# Patient Record
Sex: FEMALE | Race: BLACK OR AFRICAN AMERICAN | Employment: FULL TIME | URBAN - METROPOLITAN AREA
[De-identification: names, ages, dates, MRNs, and addresses within clinical notes are randomized per-mention and may not be internally consistent; named-entity substitution may affect disease eponyms.]

---

## 2017-08-17 ENCOUNTER — HOSPITAL ENCOUNTER (EMERGENCY)
Age: 27
Discharge: HOME | End: 2017-08-17
Attending: EMERGENCY MEDICINE
Payer: COMMERCIAL

## 2017-08-17 VITALS
RESPIRATION RATE: 16 BRPM | DIASTOLIC BLOOD PRESSURE: 88 MMHG | TEMPERATURE: 98.9 F | SYSTOLIC BLOOD PRESSURE: 142 MMHG | OXYGEN SATURATION: 100 % | BODY MASS INDEX: 48.82 KG/M2 | WEIGHT: 293 LBS | HEART RATE: 99 BPM | HEIGHT: 65 IN
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
## 2017-08-17 DIAGNOSIS — G44.311 INTRACTABLE ACUTE POST-TRAUMATIC HEADACHE: ICD-10-CM

## 2017-08-17 DIAGNOSIS — F17.200 TOBACCO DEPENDENCE: ICD-10-CM

## 2017-08-17 DIAGNOSIS — M54.50 ACUTE BILATERAL LOW BACK PAIN WITHOUT SCIATICA: Primary | ICD-10-CM

## 2017-08-17 PROCEDURE — 74011250637 HC RX REV CODE- 250/637: Performed by: PHYSICIAN ASSISTANT

## 2017-08-17 PROCEDURE — 96372 THER/PROPH/DIAG INJ SC/IM: CPT

## 2017-08-17 PROCEDURE — 74011250636 HC RX REV CODE- 250/636: Performed by: PHYSICIAN ASSISTANT

## 2017-08-17 PROCEDURE — 99283 EMERGENCY DEPT VISIT LOW MDM: CPT

## 2017-08-17 RX ORDER — OXYCODONE AND ACETAMINOPHEN 5; 325 MG/1; MG/1
1 TABLET ORAL
Qty: 12 TAB | Refills: 0 | Status: SHIPPED | OUTPATIENT
Start: 2017-08-17 | End: 2017-11-13

## 2017-08-17 RX ORDER — ONDANSETRON 4 MG/1
4 TABLET, ORALLY DISINTEGRATING ORAL
Status: DISCONTINUED | OUTPATIENT
Start: 2017-08-17 | End: 2017-08-17 | Stop reason: HOSPADM

## 2017-08-17 RX ORDER — METHOCARBAMOL 750 MG/1
750 TABLET, FILM COATED ORAL 4 TIMES DAILY
Qty: 15 TAB | Refills: 0 | Status: SHIPPED | OUTPATIENT
Start: 2017-08-17 | End: 2017-08-22

## 2017-08-17 RX ORDER — OXYCODONE AND ACETAMINOPHEN 5; 325 MG/1; MG/1
1 TABLET ORAL
Status: COMPLETED | OUTPATIENT
Start: 2017-08-17 | End: 2017-08-17

## 2017-08-17 RX ORDER — MORPHINE SULFATE 10 MG/ML
2 INJECTION, SOLUTION INTRAMUSCULAR; INTRAVENOUS
Status: COMPLETED | OUTPATIENT
Start: 2017-08-17 | End: 2017-08-17

## 2017-08-17 RX ADMIN — MORPHINE SULFATE 2 MG: 10 INJECTION INTRAMUSCULAR; INTRAVENOUS; SUBCUTANEOUS at 19:11

## 2017-08-17 RX ADMIN — ONDANSETRON 4 MG: 4 TABLET, ORALLY DISINTEGRATING ORAL at 18:46

## 2017-08-17 RX ADMIN — OXYCODONE HYDROCHLORIDE AND ACETAMINOPHEN 1 TABLET: 5; 325 TABLET ORAL at 18:46
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
## 2017-08-17 NOTE — ED NOTES
KRISTINA Newman at bedside to provide discharge paperwork. Vital signs stable. Pt in no apparent distress at this time. Mental status at baseline. Ambulatory to waiting room with steady gate, discharge paperwork in hand. Accompanied by visitors.
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
## 2017-08-21 ENCOUNTER — HOSPITAL ENCOUNTER (EMERGENCY)
Age: 27
Discharge: HOME | End: 2017-08-21
Attending: EMERGENCY MEDICINE
Payer: COMMERCIAL

## 2017-08-21 ENCOUNTER — OFFICE VISIT (OUTPATIENT)
Dept: INTERNAL MEDICINE CLINIC | Age: 27
End: 2017-08-21

## 2017-08-21 VITALS
DIASTOLIC BLOOD PRESSURE: 77 MMHG | WEIGHT: 293 LBS | SYSTOLIC BLOOD PRESSURE: 121 MMHG | OXYGEN SATURATION: 98 % | TEMPERATURE: 99 F | BODY MASS INDEX: 48.82 KG/M2 | HEART RATE: 82 BPM | RESPIRATION RATE: 16 BRPM | HEIGHT: 65 IN

## 2017-08-21 VITALS
RESPIRATION RATE: 16 BRPM | BODY MASS INDEX: 47.09 KG/M2 | HEIGHT: 66 IN | OXYGEN SATURATION: 100 % | WEIGHT: 293 LBS | DIASTOLIC BLOOD PRESSURE: 84 MMHG | TEMPERATURE: 98.2 F | HEART RATE: 82 BPM | SYSTOLIC BLOOD PRESSURE: 144 MMHG

## 2017-08-21 DIAGNOSIS — F32.A DEPRESSION, UNSPECIFIED DEPRESSION TYPE: ICD-10-CM

## 2017-08-21 DIAGNOSIS — L73.2 HIDRADENITIS AXILLARIS: ICD-10-CM

## 2017-08-21 DIAGNOSIS — M54.50 ACUTE BILATERAL LOW BACK PAIN WITHOUT SCIATICA: ICD-10-CM

## 2017-08-21 DIAGNOSIS — G44.301 INTRACTABLE POST-TRAUMATIC HEADACHE, UNSPECIFIED CHRONICITY PATTERN: ICD-10-CM

## 2017-08-21 DIAGNOSIS — F32.A DEPRESSION, UNSPECIFIED DEPRESSION TYPE: Primary | ICD-10-CM

## 2017-08-21 DIAGNOSIS — L73.2 HIDRADENITIS SUPPURATIVA: Primary | ICD-10-CM

## 2017-08-21 DIAGNOSIS — E66.01 MORBID OBESITY WITH BMI OF 45.0-49.9, ADULT (HCC): ICD-10-CM

## 2017-08-21 PROCEDURE — 74011250637 HC RX REV CODE- 250/637: Performed by: PHYSICIAN ASSISTANT

## 2017-08-21 PROCEDURE — 99283 EMERGENCY DEPT VISIT LOW MDM: CPT

## 2017-08-21 RX ORDER — OXYCODONE AND ACETAMINOPHEN 5; 325 MG/1; MG/1
2 TABLET ORAL
Status: COMPLETED | OUTPATIENT
Start: 2017-08-21 | End: 2017-08-21

## 2017-08-21 RX ORDER — CEPHALEXIN 500 MG/1
500 CAPSULE ORAL 4 TIMES DAILY
Qty: 40 CAP | Refills: 0 | Status: SHIPPED | OUTPATIENT
Start: 2017-08-21 | End: 2017-08-31

## 2017-08-21 RX ORDER — ESCITALOPRAM OXALATE 10 MG/1
10 TABLET ORAL DAILY
Qty: 30 TAB | Refills: 1 | Status: SHIPPED | OUTPATIENT
Start: 2017-08-21 | End: 2017-12-21

## 2017-08-21 RX ORDER — LIDOCAINE 50 MG/G
PATCH TOPICAL
Qty: 15 EACH | Refills: 0 | Status: SHIPPED | OUTPATIENT
Start: 2017-08-21 | End: 2017-12-21

## 2017-08-21 RX ADMIN — OXYCODONE HYDROCHLORIDE AND ACETAMINOPHEN 2 TABLET: 5; 325 TABLET ORAL at 11:02
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
## 2017-08-21 NOTE — PATIENT INSTRUCTIONS
Back Pain: Care Instructions  Your Care Instructions    Back pain has many possible causes. It is often related to problems with muscles and ligaments of the back. It may also be related to problems with the nerves, discs, or bones of the back. Moving, lifting, standing, sitting, or sleeping in an awkward way can strain the back. Sometimes you don't notice the injury until later. Arthritis is another common cause of back pain. Although it may hurt a lot, back pain usually improves on its own within several weeks. Most people recover in 12 weeks or less. Using good home treatment and being careful not to stress your back can help you feel better sooner. Follow-up care is a key part of your treatment and safety. Be sure to make and go to all appointments, and call your doctor if you are having problems. Its also a good idea to know your test results and keep a list of the medicines you take. How can you care for yourself at home? · Sit or lie in positions that are most comfortable and reduce your pain. Try one of these positions when you lie down:  ¨ Lie on your back with your knees bent and supported by large pillows. ¨ Lie on the floor with your legs on the seat of a sofa or chair. [place] on your side with your knees and hips bent and a pillow between your legs. ¨ Lie on your stomach if it does not make pain worse. · Do not sit up in bed, and avoid soft couches and twisted positions. Bed rest can help relieve pain at first, but it delays healing. Avoid bed rest after the first day of back pain. · Change positions every 30 minutes. If you must sit for long periods of time, take breaks from sitting. Get up and walk around, or lie in a comfortable position. · Try using a heating pad on a low or medium setting for 15 to 20 minutes every 2 or 3 hours. Try a warm shower in place of one session with the heating pad. · You can also try an ice pack for 10 to 15 minutes every 2 to 3 hours.  Put a thin cloth between the ice pack and your skin. · Take pain medicines exactly as directed. ¨ If the doctor gave you a prescription medicine for pain, take it as prescribed. ¨ If you are not taking a prescription pain medicine, ask your doctor if you can take an over-the-counter medicine. · Take short walks several times a day. You can start with 5 to 10 minutes, 3 or 4 times a day, and work up to longer walks. Walk on level surfaces and avoid hills and stairs until your back is better. · Return to work and other activities as soon as you can. Continued rest without activity is usually not good for your back. · To prevent future back pain, do exercises to stretch and strengthen your back and stomach. Learn how to use good posture, safe lifting techniques, and proper body mechanics. When should you call for help? Call your doctor now or seek immediate medical care if:  · You have new or worsening numbness in your legs. · You have new or worsening weakness in your legs. (This could make it hard to stand up.)  · You lose control of your bladder or bowels. Watch closely for changes in your health, and be sure to contact your doctor if:  · Your pain gets worse. · You are not getting better after 2 weeks. Where can you learn more? Go to http://cl-diana.info/. Enter G683 in the search box to learn more about \"Back Pain: Care Instructions. \"  Current as of: March 21, 2017  Content Version: 11.3  © 8315-0979 [employer]. Care instructions adapted under license by [employer] (which disclaims liability or warranty for this information). If you have questions about a medical condition or this instruction, always ask your healthcare professional. [address] any warranty or liability for your use of this information.

## 2017-08-21 NOTE — DISCHARGE INSTRUCTIONS
We hope that we have addressed all of your medical concerns. The examination and treatment you received in the Emergency Department were for an emergent problem and were not intended as complete care. It is important that you follow up with your healthcare provider(s) for ongoing care. If your symptoms worsen or do not improve as expected, and you are unable to reach your usual health care provider(s), you should return to the Emergency Department. Today's healthcare is undergoing tremendous change, and patient satisfaction surveys are one of the many tools to assess the quality of medical care. You may receive a survey from the [employer] organization regarding your experience in the Emergency Department. I hope that your experience has been completely positive, particularly the medical care that I provided. As such, please participate in the survey; anything less than excellent does not meet my expectations or intentions. [facility] and [facility] participate in nationally recognized quality of care measures. If your blood pressure is greater than 120/80, as reported below, we urge that you seek medical care to address the potential of high blood pressure, commonly known as hypertension. Hypertension can be hereditary or can be caused by certain medical conditions, pain, stress, or \"white coat syndrome. \"       Please make an appointment with your health care provider(s) for follow up of your Emergency Department visit. VITALS:   Patient Vitals for the past 8 hrs:   Temp Pulse Resp BP SpO2   08/21/17 0941 98.2 °F (36.8 °C) 88 16 133/89 98 %          Thank you for allowing us to provide you with medical care today. We realize that you have many choices for your emergency care needs. Please choose us in the future for any continued health care needs. Regards,           April VIVIAN.  Ricki, [address] [facility]. Office: [phone]            No results found for this or any previous visit (from the past 24 hour(s)). No results found. Recovering From Depression: Care Instructions  Your Care Instructions  Taking good care of yourself is important as you recover from depression. In time, your symptoms will fade as your treatment takes hold. Do not give up. Instead, focus your energy on getting better. Your mood will improve. It just takes some time. Focus on things that can help you feel better, such as being with friends and family, eating well, and getting enough rest. But take things slowly. Do not do too much too soon. You will begin to feel better gradually. Follow-up care is a key part of your treatment and safety. Be sure to make and go to all appointments, and call your doctor if you are having problems. It's also a good idea to know your test results and keep a list of the medicines you take. How can you care for yourself at home? Be realistic  · If you have a large task to do, break it up into smaller steps you can handle, and just do what you can. · You may want to put off important decisions until your depression has lifted. If you have plans that will have a major impact on your life, such as marriage, divorce, or a job change, try to wait a bit. Talk it over with friends and loved ones who can help you look at the overall picture first.  · Reaching out to people for help is important. Do not isolate yourself. Let your family and friends help you. Find someone you can trust and confide in, and talk to that person. · Be patient, and be kind to yourself. Remember that depression is not your fault and is not something you can overcome with willpower alone. Treatment is necessary for depression, just like for any other illness. Feeling better takes time, and your mood will improve little by little. Stay active  · Stay busy and get outside. Take a walk, or try some other light exercise.   · Talk with your doctor about an exercise program. Exercise can help with mild depression. · Go to a movie or concert. Take part in a [employer] activity or other social gathering. Go to a ball game. · Ask a friend to have dinner with you. Take care of yourself  · Eat a balanced diet with plenty of fresh fruits and vegetables, whole grains, and lean protein. If you have lost your appetite, eat small snacks rather than large meals. · Avoid drinking alcohol or using illegal drugs. Do not take medicines that have not been prescribed for you. They may interfere with medicines you may be taking for depression, or they may make your depression worse. · Take your medicines exactly as they are prescribed. You may start to feel better within 1 to 3 weeks of taking antidepressant medicine. But it can take as many as 6 to 8 weeks to see more improvement. If you have questions or concerns about your medicines, or if you do not notice any improvement by 3 weeks, talk to your doctor. · If you have any side effects from your medicine, tell your doctor. Antidepressants can make you feel tired, dizzy, or nervous. Some people have dry mouth, constipation, headaches, sexual problems, or diarrhea. Many of these side effects are mild and will go away on their own after you have been taking the medicine for a few weeks. Some may last longer. Talk to your doctor if side effects are bothering you too much. You might be able to try a different medicine. · Get enough sleep. If you have problems sleeping:  ¨ Go to bed at the same time every night, and get up at the same time every morning. ¨ Keep your bedroom dark and quiet. ¨ Do not exercise after 5:00 p.m. ¨ Avoid drinks with caffeine after 5:00 p.m. · Avoid sleeping pills unless they are prescribed by the doctor treating your depression. Sleeping pills may make you groggy during the day, and they may interact with other medicine you are taking.   · If you have any other illnesses, such as diabetes, heart disease, or high blood pressure, make sure to continue with your treatment. Tell your doctor about all of the medicines you take, including those with or without a prescription. · Keep the numbers for these national suicide hotlines: 2-196-543-TALK (3-509.929.6054) and 5-775-OSJRNTF (2-184.834.6103). If you or someone you know talks about suicide or feeling hopeless, get help right away. When should you call for help? Call 911 anytime you think you may need emergency care. For example, call if:  · You feel like hurting yourself or someone else. · Someone you know has depression and is about to attempt or is attempting suicide. Call your doctor now or seek immediate medical care if:  · You hear voices. · Someone you know has depression and:  ¨ Starts to give away his or her possessions. ¨ Uses illegal drugs or drinks alcohol heavily. ¨ Talks or writes about death, including writing suicide notes or talking about guns, knives, or pills. ¨ Starts to spend a lot of time alone. ¨ Acts very aggressively or suddenly appears calm. Watch closely for changes in your health, and be sure to contact your doctor if:  · You do not get better as expected. Where can you learn more? Go to http://cl-diana.info/. Enter V690 in the search box to learn more about \"Recovering From Depression: Care Instructions. \"  Current as of: July 26, 2016  Content Version: 11.3  © 7074-4247 [employer]. Care instructions adapted under license by [employer] (which disclaims liability or warranty for this information). If you have questions about a medical condition or this instruction, always ask your healthcare professional. [place] [address] any warranty or liability for your use of this information. Learning About Hidradenitis Suppurativa  What is hidradenitis suppurativa?     Hidradenitis suppurativa (say \"cyp-xgsr-gz-NY-tus sup-yur-uh-TY-vuh\") is a skin condition that causes lumps on the skin. The lumps look like pimples or boils. The condition can come and go for many years. Doctors don't know exactly how this condition starts. But they do know that something irritates and inflames the hair follicles, causing them to swell and form lumps. This skin condition can't be spread from person to person (isn't contagious). What are the symptoms? Red lumps that may look like pimples, acne, or boils appear on the skin and are usually painful. The lumps:  · Usually occur in areas where skin rubs against skin, such as in the armpit. They can also appear under the breasts, in the groin area, on the buttocks, around the anus, and on the inner thighs. · May go away on their own in a few weeks. But they often come back in the same area. · Can become infected and break open, draining blood and pus that usually smells bad. If the condition isn't treated and gets worse, hollow tunnels can form under the skin. Over time, the infection and tunnels will heal, but a thick scar may form. These scars can keep skin from stretching naturally. How is hidradenitis suppurativa treated? The treatment depends on how serious the condition is. Your doctor may discuss:  · Medicines. You may need to take pills, such as antibiotics, or rub a prescription ointment or cream on the affected skin. · Corticosteroid injections (shots). You may get these shots into the affected areas. · Hormone pills. Some women are helped by taking birth control pills or other medicines that affect their hormones. · Removing infected tissue. This is only done in severe cases. Home treatment  Home treatment may provide pain relief and help prevent nodules from coming back. Home treatment includes:  · Wearing loose-fitting clothes. · Washing the area gently with mild soap. · Staying at a healthy weight. If you are overweight, losing weight may help the condition.   · Quitting smoking, if you smoke.  Follow-up care is a key part of your treatment and safety. Be sure to make and go to all appointments, and call your doctor if you are having problems. It's also a good idea to know your test results and keep a list of the medicines you take. Where can you learn more? Go to http://cl-diana.info/. Enter 26 [id number] in the search box to learn more about \"Learning About Hidradenitis Suppurativa. \"  Current as of: October 13, 2016  Content Version: 11.3  © 9243-2866 [employer], [facility]. Care instructions adapted under license by [employer] (which disclaims liability or warranty for this information). If you have questions about a medical condition or this instruction, always ask your healthcare professional. [address] any warranty or liability for your use of this information.

## 2017-11-11 ENCOUNTER — HOSPITAL ENCOUNTER (EMERGENCY)
Age: 27
Discharge: HOME | End: 2017-11-11
Attending: EMERGENCY MEDICINE
Payer: COMMERCIAL

## 2017-11-11 ENCOUNTER — APPOINTMENT (OUTPATIENT)
Dept: CT IMAGING | Age: 27
End: 2017-11-11
Attending: NURSE PRACTITIONER
Payer: COMMERCIAL

## 2017-11-11 VITALS
RESPIRATION RATE: 18 BRPM | WEIGHT: 293 LBS | BODY MASS INDEX: 47.09 KG/M2 | TEMPERATURE: 98 F | HEIGHT: 66 IN | SYSTOLIC BLOOD PRESSURE: 126 MMHG | HEART RATE: 64 BPM | OXYGEN SATURATION: 98 % | DIASTOLIC BLOOD PRESSURE: 79 MMHG

## 2017-11-11 DIAGNOSIS — N76.0 BV (BACTERIAL VAGINOSIS): ICD-10-CM

## 2017-11-11 DIAGNOSIS — B96.89 BV (BACTERIAL VAGINOSIS): ICD-10-CM

## 2017-11-11 DIAGNOSIS — N93.9 ABNORMAL UTERINE BLEEDING (AUB): Primary | ICD-10-CM

## 2017-11-11 LAB
ALBUMIN SERPL-MCNC: 2.9 G/DL (ref 3.5–5)
ALBUMIN/GLOB SERPL: 0.8 {RATIO} (ref 1.1–2.2)
ALP SERPL-CCNC: 65 U/L (ref 45–117)
ALT SERPL-CCNC: 13 U/L (ref 12–78)
ANION GAP SERPL CALC-SCNC: 11 MMOL/L (ref 5–15)
APPEARANCE UR: ABNORMAL
AST SERPL-CCNC: 11 U/L (ref 15–37)
BACTERIA URNS QL MICRO: ABNORMAL /HPF
BASOPHILS # BLD: 0 K/UL (ref 0–0.1)
BASOPHILS NFR BLD: 0 % (ref 0–1)
BILIRUB SERPL-MCNC: 0.2 MG/DL (ref 0.2–1)
BILIRUB UR QL: NEGATIVE
BUN SERPL-MCNC: 6 MG/DL (ref 6–20)
BUN/CREAT SERPL: 13 (ref 12–20)
CALCIUM SERPL-MCNC: 8.2 MG/DL (ref 8.5–10.1)
CHLORIDE SERPL-SCNC: 107 MMOL/L (ref 97–108)
CLUE CELLS VAG QL WET PREP: NORMAL
CO2 SERPL-SCNC: 21 MMOL/L (ref 21–32)
COLOR UR: ABNORMAL
CREAT SERPL-MCNC: 0.48 MG/DL (ref 0.55–1.02)
EOSINOPHIL # BLD: 0.1 K/UL (ref 0–0.4)
EOSINOPHIL NFR BLD: 1 % (ref 0–7)
EPITH CASTS URNS QL MICRO: ABNORMAL /LPF
ERYTHROCYTE [DISTWIDTH] IN BLOOD BY AUTOMATED COUNT: 15.8 % (ref 11.5–14.5)
GLOBULIN SER CALC-MCNC: 3.8 G/DL (ref 2–4)
GLUCOSE SERPL-MCNC: 92 MG/DL (ref 65–100)
GLUCOSE UR STRIP.AUTO-MCNC: NEGATIVE MG/DL
HCG UR QL: NEGATIVE
HCT VFR BLD AUTO: 33.9 % (ref 35–47)
HGB BLD-MCNC: 10.9 G/DL (ref 11.5–16)
HGB UR QL STRIP: ABNORMAL
KETONES UR QL STRIP.AUTO: ABNORMAL MG/DL
KOH PREP SPEC: NORMAL
LEUKOCYTE ESTERASE UR QL STRIP.AUTO: ABNORMAL
LYMPHOCYTES # BLD: 2.6 K/UL (ref 0.8–3.5)
LYMPHOCYTES NFR BLD: 33 % (ref 12–49)
MCH RBC QN AUTO: 26.8 PG (ref 26–34)
MCHC RBC AUTO-ENTMCNC: 32.2 G/DL (ref 30–36.5)
MCV RBC AUTO: 83.3 FL (ref 80–99)
MONOCYTES # BLD: 0.4 K/UL (ref 0–1)
MONOCYTES NFR BLD: 6 % (ref 5–13)
NEUTS SEG # BLD: 4.6 K/UL (ref 1.8–8)
NEUTS SEG NFR BLD: 60 % (ref 32–75)
NITRITE UR QL STRIP.AUTO: NEGATIVE
PH UR STRIP: 6 [PH] (ref 5–8)
PLATELET # BLD AUTO: 376 K/UL (ref 150–400)
POTASSIUM SERPL-SCNC: 4.1 MMOL/L (ref 3.5–5.1)
PROT SERPL-MCNC: 6.7 G/DL (ref 6.4–8.2)
PROT UR STRIP-MCNC: 100 MG/DL
RBC # BLD AUTO: 4.07 M/UL (ref 3.8–5.2)
RBC #/AREA URNS HPF: >100 /HPF (ref 0–5)
SERVICE CMNT-IMP: NORMAL
SODIUM SERPL-SCNC: 139 MMOL/L (ref 136–145)
SP GR UR REFRACTOMETRY: 1.02 (ref 1–1.03)
T VAGINALIS VAG QL WET PREP: NORMAL
UROBILINOGEN UR QL STRIP.AUTO: 1 EU/DL (ref 0.2–1)
WBC # BLD AUTO: 7.7 K/UL (ref 3.6–11)
WBC URNS QL MICRO: ABNORMAL /HPF (ref 0–4)

## 2017-11-11 PROCEDURE — 36415 COLL VENOUS BLD VENIPUNCTURE: CPT | Performed by: NURSE PRACTITIONER

## 2017-11-11 PROCEDURE — 81025 URINE PREGNANCY TEST: CPT

## 2017-11-11 PROCEDURE — 81001 URINALYSIS AUTO W/SCOPE: CPT | Performed by: NURSE PRACTITIONER

## 2017-11-11 PROCEDURE — 74011636320 HC RX REV CODE- 636/320: Performed by: EMERGENCY MEDICINE

## 2017-11-11 PROCEDURE — 80053 COMPREHEN METABOLIC PANEL: CPT | Performed by: NURSE PRACTITIONER

## 2017-11-11 PROCEDURE — 85025 COMPLETE CBC W/AUTO DIFF WBC: CPT | Performed by: NURSE PRACTITIONER

## 2017-11-11 PROCEDURE — 87210 SMEAR WET MOUNT SALINE/INK: CPT | Performed by: NURSE PRACTITIONER

## 2017-11-11 PROCEDURE — 74011250637 HC RX REV CODE- 250/637: Performed by: NURSE PRACTITIONER

## 2017-11-11 PROCEDURE — 87491 CHLMYD TRACH DNA AMP PROBE: CPT | Performed by: NURSE PRACTITIONER

## 2017-11-11 PROCEDURE — 99285 EMERGENCY DEPT VISIT HI MDM: CPT

## 2017-11-11 PROCEDURE — 74011250636 HC RX REV CODE- 250/636: Performed by: NURSE PRACTITIONER

## 2017-11-11 PROCEDURE — 93005 ELECTROCARDIOGRAM TRACING: CPT

## 2017-11-11 PROCEDURE — 96374 THER/PROPH/DIAG INJ IV PUSH: CPT

## 2017-11-11 PROCEDURE — 74177 CT ABD & PELVIS W/CONTRAST: CPT

## 2017-11-11 RX ORDER — KETOROLAC TROMETHAMINE 30 MG/ML
30 INJECTION, SOLUTION INTRAMUSCULAR; INTRAVENOUS ONCE
Status: COMPLETED | OUTPATIENT
Start: 2017-11-11 | End: 2017-11-11

## 2017-11-11 RX ORDER — METRONIDAZOLE 500 MG/1
500 TABLET ORAL 2 TIMES DAILY
Qty: 14 TAB | Refills: 0 | Status: SHIPPED | OUTPATIENT
Start: 2017-11-11 | End: 2017-11-18

## 2017-11-11 RX ORDER — OXYCODONE HYDROCHLORIDE 5 MG/1
5 TABLET ORAL
Status: DISCONTINUED | OUTPATIENT
Start: 2017-11-11 | End: 2017-11-11

## 2017-11-11 RX ADMIN — KETOROLAC TROMETHAMINE 30 MG: 30 INJECTION, SOLUTION INTRAMUSCULAR at 10:16

## 2017-11-11 RX ADMIN — OXYCODONE HYDROCHLORIDE 5 MG: 5 TABLET ORAL at 10:07

## 2017-11-11 RX ADMIN — IOPAMIDOL 100 ML: 755 INJECTION, SOLUTION INTRAVENOUS at 10:39

## 2017-11-11 NOTE — DISCHARGE INSTRUCTIONS
If you have any sudden shortness of breath or any dizziness, please return to the emergency room. Further, if you start saturating a maxi pad an hour, please return to the nearest emergency room. Abnormal Uterine Bleeding: Care Instructions  Your Care Instructions    Abnormal uterine bleeding (AUB) is irregular bleeding from the uterus that is longer or heavier than usual or does not occur at your regular time. Sometimes it is caused by changes in hormone levels. It can also be caused by growths in the uterus, such as fibroids or polyps. Sometimes a cause cannot be found. You may have heavy bleeding when you are not expecting your period. Your doctor may suggest a pregnancy test, if you think you are pregnant. Follow-up care is a key part of your treatment and safety. Be sure to make and go to all appointments, and call your doctor if you are having problems. It's also a good idea to know your test results and keep a list of the medicines you take. How can you care for yourself at home? · Be safe with medicines. Take pain medicines exactly as directed. ¨ If the doctor gave you a prescription medicine for pain, take it as prescribed. ¨ If you are not taking a prescription pain medicine, ask your doctor if you can take an over-the-counter medicine. · You may be low in iron because of blood loss. Eat a balanced diet that is high in iron and vitamin C. Foods rich in iron include red meat, shellfish, eggs, beans, and leafy green vegetables. Talk to your doctor about whether you need to take iron pills or a multivitamin. When should you call for help? Call 911 anytime you think you may need emergency care. For example, call if:  ? · You passed out (lost consciousness). ?Call your doctor now or seek immediate medical care if:  ? · You have new or worse belly or pelvic pain. ? · You have severe vaginal bleeding. ? · You feel dizzy or lightheaded, or you feel like you may faint. ? Watch closely for changes in your health, and be sure to contact your doctor if:  ? · You think you may be pregnant. ? · Your bleeding gets worse. ? · You do not get better as expected. Where can you learn more? Go to http://cl-diana.info/. Enter Y784 in the search box to learn more about \"Abnormal Uterine Bleeding: Care Instructions. \"  Current as of: October 13, 2016  Content Version: 11.4  © 7452-6214 [employer]. Care instructions adapted under license by [employer] (which disclaims liability or warranty for this information). If you have questions about a medical condition or this instruction, always ask your healthcare professional. [address] any warranty or liability for your use of this information.
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
## 2017-11-11 NOTE — ED PROVIDER NOTES
HPI Comments: Jj Manuel is a 32 y.o. female who presents by way of EMS to the ED with  c/o abdominal pain and vaginal bleeding. Patient states she had her regular menstrual cycle 2 weeks ago and began having heavy vaginal bleeding that started last night. States her menstrual cycle is rarely irregular, but it has happened in the past. Patient states she also has abdominal pain that started periumbilical and now has migrated to the right upper and lower quadrant of abdomen. Patient denies chest pain, denies shortness of breath, denies dizziness. Patient states she has a history of afib, not on anticoagulation now. Patient was on coumadin less than a year ago. There are no urinary urgency or frequency symptoms. No further complaints at this time. PCP: Nury Pagan MD    PMHx significant for: Past Medical History:  No date: Anemia  No date: Asthma  2016: Discharge from both nipples  No date: Heart abnormality      Comment: Atrial fibrillation   No date: Morbid obesity (HCC)    PSHx significant for: Past Surgical History:  No date:  DELIVERY ONLY      Comment: c/s x 3  No date: HX GYN  No date: HX HEENT      Comment: tonsilectomy  No date: HX OTHER SURGICAL      Comment: tonsillectomy c-sections +3    Social Hx: Tobacco: daily user EtOH: none Illicit drug use: none    There are no further complaints or symptoms at this time. The history is provided by the patient and the EMS personnel.         Past Medical History:   Diagnosis Date    Anemia     Asthma     Discharge from both nipples 2016    Heart abnormality     Atrial fibrillation     Morbid obesity (HCC)        Past Surgical History:   Procedure Laterality Date     DELIVERY ONLY      c/s x 3    HX GYN      HX HEENT      tonsilectomy    HX OTHER SURGICAL      tonsillectomy c-sections +3         Family History:   Problem Relation Age of Onset    Hypertension Father        Social History     Social History    Marital status: SINGLE     Spouse name: N/A    Number of children: N/A    Years of education: N/A     Occupational History    Not on file. Social History Main Topics    Smoking status: Current Every Day Smoker    Smokeless tobacco: Never Used    Alcohol use No    Drug use: No    Sexual activity: Yes     Partners: Male     Other Topics Concern    Not on file     Social History Narrative         ALLERGIES: Review of patient's allergies indicates no known allergies. Review of Systems   Constitutional: Positive for appetite change (diminished). Negative for chills, diaphoresis, fatigue and fever. HENT: Negative for congestion, ear discharge, ear pain, sinus pain, sinus pressure, sore throat and trouble swallowing. Eyes: Negative for photophobia, pain, redness and visual disturbance. Respiratory: Negative for chest tightness, shortness of breath and wheezing. Cardiovascular: Negative for chest pain and palpitations. Gastrointestinal: Positive for abdominal pain. Negative for abdominal distention, constipation, diarrhea, nausea and vomiting. Anal bleeding: right upper and lower quadrant pain. Endocrine: Negative. Genitourinary: Positive for menstrual problem and vaginal bleeding. Negative for difficulty urinating, flank pain, frequency, urgency, vaginal discharge and vaginal pain. Musculoskeletal: Negative for back pain, neck pain and neck stiffness. Skin: Negative for color change, pallor, rash and wound. Allergic/Immunologic: Negative. Neurological: Negative for dizziness, speech difficulty, weakness and headaches. Hematological: Does not bruise/bleed easily. Psychiatric/Behavioral: Negative for behavioral problems. The patient is not nervous/anxious.         Vitals:    11/11/17 0857   BP: 136/87   Pulse: 87   Resp: 16   Temp: 98.8 °F (37.1 °C)   SpO2: 99%   Weight: 136.1 kg (300 lb)   Height: 5' 6\" (1.676 m)            Physical Exam   Constitutional: She is oriented to person, place, and time. She appears well-developed and well-nourished. No distress. HENT:   Head: Normocephalic and atraumatic. Right Ear: External ear normal.   Left Ear: External ear normal.   Nose: Nose normal.   Mouth/Throat: Oropharynx is clear and moist.   Eyes: Conjunctivae and EOM are normal. Pupils are equal, round, and reactive to light. Right eye exhibits no discharge. Left eye exhibits no discharge. Neck: Normal range of motion. Neck supple. No JVD present. No tracheal deviation present. Cardiovascular: Normal rate, regular rhythm, normal heart sounds and intact distal pulses. Exam reveals no gallop. No murmur heard. Pulmonary/Chest: Effort normal and breath sounds normal. No respiratory distress. She has no wheezes. She has no rales. She exhibits no tenderness. Abdominal: Soft. Bowel sounds are normal. She exhibits no distension. There is tenderness. There is no rebound and no guarding. Genitourinary: Vaginal discharge (bleeding) found. Genitourinary Comments: Urinary system negative. Musculoskeletal: Normal range of motion. She exhibits no edema or tenderness. Neurological: She is alert and oriented to person, place, and time. Skin: Skin is warm and dry. No rash noted. No erythema. No pallor. Psychiatric: She has a normal mood and affect. Her behavior is normal. Judgment and thought content normal.   Nursing note and vitals reviewed. MDM  Number of Diagnoses or Management Options  Abnormal uterine bleeding (AUB): new and requires workup  BV (bacterial vaginosis): new and requires workup  Diagnosis management comments: Plan:  Discharge to home and follow up with PCP and OB/GYN.        Amount and/or Complexity of Data Reviewed  Clinical lab tests: ordered and reviewed  Tests in the radiology section of CPT®: ordered and reviewed  Discuss the patient with other providers: yes (Discussed plan of care with Dr. Lien Bone.)      ED Course   4646: Initial assessment of patient as documented. Pelvic Exam  Date/Time: 11/11/2017 9:39 AM  Performed by: NP  Procedure duration:  3 minutes. Exam assisted by:  Johanna Silverman. Type of exam performed: bimanual and speculum. External genitalia appearance: normal.    Vaginal exam:  bleeding. Bleeding: moderate  Cervical exam:  normal.    Specimen(s) collected:  chlamydia, GC and vaginal culture. Bimanual exam:  normal.    Patient tolerance: Patient tolerated the procedure well with no immediate complications        ED EKG interpretation:9:51 AM  Rhythm: normal sinus rhythm; and regular . Rate (approx.): 73; Axis: normal; P wave: normal; QRS interval: normal ; ST/T wave: normal; Other findings: normal. This EKG was interpreted by Lissa Amador MD,ED Provider. 1105: Reviewed CT scan and labs. Educated patient on bleeding precautions and when to return to the emergency room. Patient verbalized understanding. 11:14 AM  Pt has been reexamined. Pt has no new complaints, changes or physical findings. Care plan outlined and precautions discussed. All available results were reviewed with pt. All medications were reviewed with pt. All of pt's questions and concerns were addressed. Pt agrees to F/U as instructed and agrees to return to ED upon further deterioration. Pt is ready to go home.   Angy Gunn NP

## 2017-11-11 NOTE — LETTER
[address] 
[facility] EMERGENCY DEPT 
[address] [facility] [address] [id number] 
[phone] Work/School Note Date: 11/11/2017 To Whom It May concern: 
 
Buzz Rice was seen and treated today in the emergency room by the following provider(s): 
Attending Provider: Jami Canela MD 
Nurse Practitioner: Mariah Wu NP.    
 
 
Sincerely, 
 
 
 
 
Mariah Wu NP

## 2017-11-12 LAB
ATRIAL RATE: 73 BPM
CALCULATED P AXIS, ECG09: 40 DEGREES
CALCULATED R AXIS, ECG10: 44 DEGREES
CALCULATED T AXIS, ECG11: 9 DEGREES
DIAGNOSIS, 93000: NORMAL
P-R INTERVAL, ECG05: 180 MS
Q-T INTERVAL, ECG07: 392 MS
QRS DURATION, ECG06: 74 MS
QTC CALCULATION (BEZET), ECG08: 431 MS
VENTRICULAR RATE, ECG03: 73 BPM

## 2017-11-13 ENCOUNTER — HOSPITAL ENCOUNTER (EMERGENCY)
Age: 27
Discharge: HOME | End: 2017-11-13
Attending: EMERGENCY MEDICINE
Payer: COMMERCIAL

## 2017-11-13 ENCOUNTER — APPOINTMENT (OUTPATIENT)
Dept: ULTRASOUND IMAGING | Age: 27
End: 2017-11-13
Attending: EMERGENCY MEDICINE
Payer: COMMERCIAL

## 2017-11-13 VITALS
DIASTOLIC BLOOD PRESSURE: 89 MMHG | RESPIRATION RATE: 16 BRPM | TEMPERATURE: 97.8 F | SYSTOLIC BLOOD PRESSURE: 120 MMHG | HEART RATE: 70 BPM | OXYGEN SATURATION: 100 % | BODY MASS INDEX: 48.89 KG/M2 | WEIGHT: 293 LBS

## 2017-11-13 DIAGNOSIS — I49.3 PVC'S (PREMATURE VENTRICULAR CONTRACTIONS): ICD-10-CM

## 2017-11-13 DIAGNOSIS — N93.8 DUB (DYSFUNCTIONAL UTERINE BLEEDING): Primary | ICD-10-CM

## 2017-11-13 LAB
ANION GAP SERPL CALC-SCNC: 7 MMOL/L (ref 5–15)
ATRIAL RATE: 83 BPM
BASOPHILS # BLD: 0 K/UL (ref 0–0.1)
BASOPHILS NFR BLD: 0 % (ref 0–1)
BUN SERPL-MCNC: 8 MG/DL (ref 6–20)
BUN/CREAT SERPL: 12 (ref 12–20)
C TRACH DNA SPEC QL NAA+PROBE: NEGATIVE
CALCIUM SERPL-MCNC: 8.7 MG/DL (ref 8.5–10.1)
CALCULATED P AXIS, ECG09: 44 DEGREES
CALCULATED R AXIS, ECG10: 46 DEGREES
CALCULATED T AXIS, ECG11: 7 DEGREES
CHLORIDE SERPL-SCNC: 105 MMOL/L (ref 97–108)
CO2 SERPL-SCNC: 27 MMOL/L (ref 21–32)
CREAT SERPL-MCNC: 0.65 MG/DL (ref 0.55–1.02)
DIAGNOSIS, 93000: NORMAL
EOSINOPHIL # BLD: 0.1 K/UL (ref 0–0.4)
EOSINOPHIL NFR BLD: 1 % (ref 0–7)
ERYTHROCYTE [DISTWIDTH] IN BLOOD BY AUTOMATED COUNT: 15.5 % (ref 11.5–14.5)
GLUCOSE SERPL-MCNC: 128 MG/DL (ref 65–100)
HCT VFR BLD AUTO: 32 % (ref 35–47)
HGB BLD-MCNC: 10.1 G/DL (ref 11.5–16)
LYMPHOCYTES # BLD: 3 K/UL (ref 0.8–3.5)
LYMPHOCYTES NFR BLD: 44 % (ref 12–49)
MAGNESIUM SERPL-MCNC: 1.7 MG/DL (ref 1.6–2.4)
MCH RBC QN AUTO: 26.4 PG (ref 26–34)
MCHC RBC AUTO-ENTMCNC: 31.6 G/DL (ref 30–36.5)
MCV RBC AUTO: 83.6 FL (ref 80–99)
MONOCYTES # BLD: 0.3 K/UL (ref 0–1)
MONOCYTES NFR BLD: 5 % (ref 5–13)
N GONORRHOEA DNA SPEC QL NAA+PROBE: NEGATIVE
NEUTS SEG # BLD: 3.3 K/UL (ref 1.8–8)
NEUTS SEG NFR BLD: 50 % (ref 32–75)
P-R INTERVAL, ECG05: 172 MS
PLATELET # BLD AUTO: 309 K/UL (ref 150–400)
POTASSIUM SERPL-SCNC: 3.8 MMOL/L (ref 3.5–5.1)
Q-T INTERVAL, ECG07: 364 MS
QRS DURATION, ECG06: 78 MS
QTC CALCULATION (BEZET), ECG08: 427 MS
RBC # BLD AUTO: 3.83 M/UL (ref 3.8–5.2)
SAMPLE TYPE: NORMAL
SERVICE CMNT-IMP: NORMAL
SODIUM SERPL-SCNC: 139 MMOL/L (ref 136–145)
SPECIMEN SOURCE: NORMAL
TSH SERPL DL<=0.05 MIU/L-ACNC: 0.36 UIU/ML (ref 0.36–3.74)
VENTRICULAR RATE, ECG03: 83 BPM
WBC # BLD AUTO: 6.7 K/UL (ref 3.6–11)

## 2017-11-13 PROCEDURE — 96361 HYDRATE IV INFUSION ADD-ON: CPT

## 2017-11-13 PROCEDURE — 80048 BASIC METABOLIC PNL TOTAL CA: CPT | Performed by: EMERGENCY MEDICINE

## 2017-11-13 PROCEDURE — 93005 ELECTROCARDIOGRAM TRACING: CPT

## 2017-11-13 PROCEDURE — 76856 US EXAM PELVIC COMPLETE: CPT

## 2017-11-13 PROCEDURE — 85025 COMPLETE CBC W/AUTO DIFF WBC: CPT | Performed by: EMERGENCY MEDICINE

## 2017-11-13 PROCEDURE — 76830 TRANSVAGINAL US NON-OB: CPT

## 2017-11-13 PROCEDURE — 83735 ASSAY OF MAGNESIUM: CPT | Performed by: EMERGENCY MEDICINE

## 2017-11-13 PROCEDURE — 36415 COLL VENOUS BLD VENIPUNCTURE: CPT | Performed by: EMERGENCY MEDICINE

## 2017-11-13 PROCEDURE — 74011250636 HC RX REV CODE- 250/636: Performed by: EMERGENCY MEDICINE

## 2017-11-13 PROCEDURE — 96374 THER/PROPH/DIAG INJ IV PUSH: CPT

## 2017-11-13 PROCEDURE — 84443 ASSAY THYROID STIM HORMONE: CPT | Performed by: EMERGENCY MEDICINE

## 2017-11-13 PROCEDURE — 99284 EMERGENCY DEPT VISIT MOD MDM: CPT

## 2017-11-13 RX ORDER — LANOLIN ALCOHOL/MO/W.PET/CERES
325 CREAM (GRAM) TOPICAL
Qty: 30 TAB | Refills: 0 | Status: SHIPPED | OUTPATIENT
Start: 2017-11-13 | End: 2017-12-21

## 2017-11-13 RX ORDER — KETOROLAC TROMETHAMINE 30 MG/ML
30 INJECTION, SOLUTION INTRAMUSCULAR; INTRAVENOUS
Status: COMPLETED | OUTPATIENT
Start: 2017-11-13 | End: 2017-11-13

## 2017-11-13 RX ORDER — MEDROXYPROGESTERONE ACETATE 10 MG/1
TABLET ORAL
Qty: 22 TAB | Refills: 0 | Status: SHIPPED | OUTPATIENT
Start: 2017-11-13 | End: 2017-12-21

## 2017-11-13 RX ORDER — NAPROXEN 375 MG/1
375 TABLET ORAL 2 TIMES DAILY WITH MEALS
Qty: 14 TAB | Refills: 0 | Status: SHIPPED | OUTPATIENT
Start: 2017-11-13 | End: 2017-12-21

## 2017-11-13 RX ADMIN — SODIUM CHLORIDE 1000 ML: 900 INJECTION, SOLUTION INTRAVENOUS at 11:29

## 2017-11-13 RX ADMIN — KETOROLAC TROMETHAMINE 30 MG: 30 INJECTION, SOLUTION INTRAMUSCULAR at 12:43

## 2017-11-13 NOTE — ED NOTES
Pt was educated on DC instructions by physician. Pt stated understanding. Pt was able to ambulate to lobby without incident with family. Pt stated she has all belongings.

## 2017-11-13 NOTE — ED PROVIDER NOTES
HPI Comments: 32 y.o. female with past medical history significant for anemia, asthma, atrial fibrillation, and morbid obesity who presents from home with chief complaint of vaginal bleeding. Patient states onset of persistent vaginal bleeding over the last 3 days that has progressively worsened. Patient reports she has been changing her pads every hour with accompanying clots. Patient states her last menstrual period was 2 weeks ago, and it was unusual since it lasted 8 days. Patient began her vaginal bleeding a few days later and it has not stopped. Patient also complains of moderate generalized body aches with her vaginal bleeding. Patient notes hx of atrial fibrillation and felt some palpitations 1 hour ago suspected from her worsening pain. Patient denies feeling atrial fibrillation at this time. Patient admits hx of atrial fibrillation at pregnancy and has been having intermittent episodes since then, but she denies ever seeing a cardiologist for this. Patient denies taking any medications for her atrial fibrillation. Patient has an appointment with her OB/GYN, Dr. Parveen Castaneda, at [facility] today. Patient denies fever. There are no other acute medical concerns at this time. Old Chart Review: Per note, patient was evaluated here 2 days ago for generalized abdominal pain and persistent vaginal bleeding. She has hx of a-fib, but not on meds now. She used to be on Coumadin less than a year ago. CT scan was normal with a hemoglobin of 10.9. Patient was discharged with Flagyl following a dx of bacterial vaginosis and abnormal uterine bleeding. OB.GYN: Nathalie Hernandez MD    Social hx: Tobacco Use: Yes (1/4 pack per day), Alcohol Use: No, Drug Use: No    Note written by Rima Moreno, as dictated by Lazarus Ruse, MD 11:08 AM      The history is provided by the patient and medical records.         Past Medical History:   Diagnosis Date    Anemia     Asthma     Discharge from both nipples 11/14/2016    Heart abnormality     Atrial fibrillation     Morbid obesity (HCC)        Past Surgical History:   Procedure Laterality Date     DELIVERY ONLY      c/s x 3    HX GYN      HX HEENT      tonsilectomy    HX OTHER SURGICAL      tonsillectomy c-sections +3         Family History:   Problem Relation Age of Onset    Hypertension Father        Social History     Social History    Marital status: SINGLE     Spouse name: N/A    Number of children: N/A    Years of education: N/A     Occupational History    Not on file. Social History Main Topics    Smoking status: Current Every Day Smoker     Packs/day: 0.25    Smokeless tobacco: Never Used    Alcohol use No    Drug use: No    Sexual activity: Yes     Partners: Male     Other Topics Concern    Not on file     Social History Narrative         ALLERGIES: Review of patient's allergies indicates no known allergies. Review of Systems   Constitutional: Negative for chills and fever. Cardiovascular: Positive for palpitations. Genitourinary: Positive for vaginal bleeding. Musculoskeletal: Positive for myalgias (generalized). All other systems reviewed and are negative. Vitals:    17 1058 17 1100   BP:  142/88   Pulse:  86   Resp:  18   Temp:  97.8 °F (36.6 °C)   SpO2:  99%   Weight: 137.4 kg (302 lb 14.6 oz)             Physical Exam   Constitutional: She is oriented to person, place, and time. She appears well-developed. No distress. Morbidly obese   HENT:   Head: Normocephalic and atraumatic. Eyes: Conjunctivae are normal.   Neck: Normal range of motion. Cardiovascular: Normal rate, regular rhythm, normal heart sounds and intact distal pulses. Exam reveals no friction rub. No murmur heard. Pulmonary/Chest: Effort normal and breath sounds normal. No respiratory distress. She has no wheezes. She has no rales. She exhibits no tenderness. Abdominal: Soft. Bowel sounds are normal. She exhibits no distension.  There is no tenderness. There is no rebound and no guarding. Genitourinary:   Genitourinary Comments: Moderate vaginal blooding from os; rectum with yellow stool no blood   Musculoskeletal: Normal range of motion. Neurological: She is alert and oriented to person, place, and time. Coordination normal.   Skin: Skin is warm and dry. She is not diaphoretic. No pallor. Psychiatric: She has a normal mood and affect. Her behavior is normal.   Nursing note and vitals reviewed. MDM  Number of Diagnoses or Management Options  DUB (dysfunctional uterine bleeding):   PVC's (premature ventricular contractions):   Diagnosis management comments: Palpitations- check electrolytes and tsh monitor not in afib now. Discussed with pt need to follow up if still happening after she had a baby as she is a stroke risk    Vaginal bleeding- recheck hb. Had appt at 1 pm today. Pelvic with no clots moderate bleeding. Reports now having blood in her stool. On rectal no blood in stool suspect it is mixed from vaginal bleeding       Amount and/or Complexity of Data Reviewed  Clinical lab tests: ordered and reviewed  Tests in the radiology section of CPT®: ordered and reviewed  Review and summarize past medical records: yes  Discuss the patient with other providers: yes (ob)  Independent visualization of images, tracings, or specimens: yes (ekg)    Patient Progress  Patient progress: stable    ED Course       Procedures    EKG interpretation: (Preliminary)  Rhythm: normal sinus rhythm; and regular . Rate (approx.): 80; Axis: normal; P wave: normal; QRS interval: normal ; ST/T wave: non-specific changes    11:43 AM  Pt requesting pain medication toradol ordered. She states it does not work. Discussed it is indicated for vaginal bleeding    CONSULT NOTE:  12:13 PM Rolf Dodson MD spoke with Dr. Modesto Brumfield, Consult for OBGYN. Discussed available diagnostic tests and clinical findings. She is in agreement with care plans as outlined.   Dr. Onur Buck recommends starting patient on Provera 10 mg TID x3 days, 10 mg BID x3 days, 10 mg daily until scheduled f/u with OBGYN in 5-7 days. She is familiar with the patient and notes she is known to be non-compliant. She had a tubal ligation in March 2017 in which her Nexplanon IUD was removed. Pt had no evidence of fibroids, endometriosis or other abnormalities at that time. Pt has had vague complaint of vaginal bleeding on multiple occasions in the past in which she has scheduled ultrasounds that the patient does not attend. 1:30 PM  Reviewed telemetry in the ED which indicated few PVCs, but no afib. At length, pt was informed of the importance of following-up with cardiology for a Holter monitor. Pt was additionally educated at length regarding the Provera taper that Dr. Cal Welch requested, specifically that pt would resume bleeding if she does not make an appointment to f/u with Dr. Cal Welch, [facility]. Discussed available lab and imaging results with patient. She verbalizes understanding and agrees with care plan. Will discharge patient home with specific return precautions; Provera taper (10 mg TID x3 days, 10 mg BID x3 days, 10 mg every day until f/u), f/u with OBGYN.  Discussed with pt reisk of bleeding if she stops taper and has not been started on some other hormone therapy thus the importance of following up    Recent Results (from the past 24 hour(s))   EKG, 12 LEAD, INITIAL    Collection Time: 11/13/17 11:07 AM   Result Value Ref Range    Ventricular Rate 83 BPM    Atrial Rate 83 BPM    P-R Interval 172 ms    QRS Duration 78 ms    Q-T Interval 364 ms    QTC Calculation (Bezet) 427 ms    Calculated P Axis 44 degrees    Calculated R Axis 46 degrees    Calculated T Axis 7 degrees    Diagnosis       Normal sinus rhythm  Normal ECG  When compared with ECG of 11-NOV-2017 09:11,  No significant change was found     CBC WITH AUTOMATED DIFF    Collection Time: 11/13/17 11:25 AM   Result Value Ref Range    WBC 6.7 3.6 - 11.0 K/uL    RBC 3.83 3.80 - 5.20 M/uL    HGB 10.1 (L) 11.5 - 16.0 g/dL    HCT 32.0 (L) 35.0 - 47.0 %    MCV 83.6 80.0 - 99.0 FL    MCH 26.4 26.0 - 34.0 PG    MCHC 31.6 30.0 - 36.5 g/dL    RDW 15.5 (H) 11.5 - 14.5 %    PLATELET 355 780 - 892 K/uL    NEUTROPHILS 50 32 - 75 %    LYMPHOCYTES 44 12 - 49 %    MONOCYTES 5 5 - 13 %    EOSINOPHILS 1 0 - 7 %    BASOPHILS 0 0 - 1 %    ABS. NEUTROPHILS 3.3 1.8 - 8.0 K/UL    ABS. LYMPHOCYTES 3.0 0.8 - 3.5 K/UL    ABS. MONOCYTES 0.3 0.0 - 1.0 K/UL    ABS. EOSINOPHILS 0.1 0.0 - 0.4 K/UL    ABS. BASOPHILS 0.0 0.0 - 0.1 K/UL   METABOLIC PANEL, BASIC    Collection Time: 11/13/17 11:25 AM   Result Value Ref Range    Sodium 139 136 - 145 mmol/L    Potassium 3.8 3.5 - 5.1 mmol/L    Chloride 105 97 - 108 mmol/L    CO2 27 21 - 32 mmol/L    Anion gap 7 5 - 15 mmol/L    Glucose 128 (H) 65 - 100 mg/dL    BUN 8 6 - 20 MG/DL    Creatinine 0.65 0.55 - 1.02 MG/DL    BUN/Creatinine ratio 12 12 - 20      GFR est AA >60 >60 ml/min/1.73m2    GFR est non-AA >60 >60 ml/min/1.73m2    Calcium 8.7 8.5 - 10.1 MG/DL   MAGNESIUM    Collection Time: 11/13/17 11:25 AM   Result Value Ref Range    Magnesium 1.7 1.6 - 2.4 mg/dL   TSH 3RD GENERATION    Collection Time: 11/13/17 11:25 AM   Result Value Ref Range    TSH 0.36 0.36 - 3.74 uIU/mL       Us Transvaginal    Result Date: 11/13/2017  INDICATION: Abnormal vaginal bleeding COMPARISON: none The patient was studied with real-time ultrasound using transvaginal and transvesicle techniques. Transabdominal: Uterus: The uterus is normal shape and echotexture measuring 13.2 x 6.3 x 5.2 cm. Endometrial stripe is 2 mm. Ovaries:  Both ovaries are elliptical and solid. Right ovary 3.2 x 2.1 x 2.2 cm. Left ovary 2.7 x 1.3 x 2.8 cm. Doppler flow can be identified in the right ovary. It is not visualized in the left ovary but this is most likely technical. No abnormal fluid is seen.  Transvaginal: Transvaginal exam was performed to better evaluate the endometrial stripe and ovaries. Uterus: The uterus is normal in shape and echotexture measuring 10.85, 5.6 6.6 cm. Endometrial stripe is approximately 4 mm. There is a tiny nabothian cyst in the cervix. Ovaries: Ovaries obscured by bowel gas No abnormal fluid. IMPRESSION:  Normal uterus. Ovaries normal by transabdominal technique. Us Pelv Non Obs    Result Date: 11/13/2017  INDICATION: Abnormal vaginal bleeding COMPARISON: none The patient was studied with real-time ultrasound using transvaginal and transvesicle techniques. Transabdominal: Uterus: The uterus is normal shape and echotexture measuring 13.2 x 6.3 x 5.2 cm. Endometrial stripe is 2 mm. Ovaries:  Both ovaries are elliptical and solid. Right ovary 3.2 x 2.1 x 2.2 cm. Left ovary 2.7 x 1.3 x 2.8 cm. Doppler flow can be identified in the right ovary. It is not visualized in the left ovary but this is most likely technical. No abnormal fluid is seen. Transvaginal: Transvaginal exam was performed to better evaluate the endometrial stripe and ovaries. Uterus: The uterus is normal in shape and echotexture measuring 10.85, 5.6 6.6 cm. Endometrial stripe is approximately 4 mm. There is a tiny nabothian cyst in the cervix. Ovaries: Ovaries obscured by bowel gas No abnormal fluid. IMPRESSION:  Normal uterus. Ovaries normal by transabdominal technique. This note will not be viewable in [employer].

## 2017-11-13 NOTE — ED TRIAGE NOTES
Pt reports generalized body aches, vaginal bleeding with large clots, and \"a fib is acting up. \" Denies chest pain, + palpitations.

## 2017-11-13 NOTE — DISCHARGE INSTRUCTIONS
Abnormal Uterine Bleeding: Care Instructions  Your Care Instructions    Abnormal uterine bleeding (AUB) is irregular bleeding from the uterus that is longer or heavier than usual or does not occur at your regular time. Sometimes it is caused by changes in hormone levels. It can also be caused by growths in the uterus, such as fibroids or polyps. Sometimes a cause cannot be found. You may have heavy bleeding when you are not expecting your period. Your doctor may suggest a pregnancy test, if you think you are pregnant. Follow-up care is a key part of your treatment and safety. Be sure to make and go to all appointments, and call your doctor if you are having problems. It's also a good idea to know your test results and keep a list of the medicines you take. How can you care for yourself at home? · Be safe with medicines. Take pain medicines exactly as directed. ¨ If the doctor gave you a prescription medicine for pain, take it as prescribed. ¨ If you are not taking a prescription pain medicine, ask your doctor if you can take an over-the-counter medicine. · You may be low in iron because of blood loss. Eat a balanced diet that is high in iron and vitamin C. Foods rich in iron include red meat, shellfish, eggs, beans, and leafy green vegetables. Talk to your doctor about whether you need to take iron pills or a multivitamin. When should you call for help? Call 911 anytime you think you may need emergency care. For example, call if:  ? · You passed out (lost consciousness). ?Call your doctor now or seek immediate medical care if:  ? · You have new or worse belly or pelvic pain. ? · You have severe vaginal bleeding. ? · You feel dizzy or lightheaded, or you feel like you may faint. ? Watch closely for changes in your health, and be sure to contact your doctor if:  ? · You think you may be pregnant. ? · Your bleeding gets worse. ? · You do not get better as expected.    Where can you learn more?  Go to http://cl-diana.info/. Enter Y343 in the search box to learn more about \"Abnormal Uterine Bleeding: Care Instructions. \"  Current as of: October 13, 2016  Content Version: 11.4  © 5543-8747 [employer]. Care instructions adapted under license by [employer] (which disclaims liability or warranty for this information). If you have questions about a medical condition or this instruction, always ask your healthcare professional. [address] any warranty or liability for your use of this information. Vaginal Bleeding in Nonpregnant Women: Care Instructions  Your Care Instructions    Many women have bleeding or spotting between periods. Lots of things can cause it. You may bleed because of hormone problems, stress, or ovulation. Fibroids and IUDs (intrauterine devices) can also cause bleeding. If your bleeding or spotting is caused by one of these things and is not heavy or doesn't happen often, you probably don't need to worry. But in rare cases, infection, cancer, or other serious conditions can cause bleeding. So you may need more tests to find the cause of your bleeding. The doctor has checked you carefully, but problems can develop later. If you notice any problems or new symptoms, get medical treatment right away. Follow-up care is a key part of your treatment and safety. Be sure to make and go to all appointments, and call your doctor if you are having problems. It's also a good idea to know your test results and keep a list of the medicines you take. How can you care for yourself at home? · Take pain medicines exactly as directed. ¨ If the doctor gave you a prescription medicine for pain, take it as prescribed. ¨ If you are not taking a prescription pain medicine, ask your doctor if you can take an over-the-counter medicine. Do not take aspirin, which may make bleeding worse.   · If your doctor prescribed birth control pills for your bleeding, take them as directed. · Eat foods that are high in iron and vitamin C. Foods high in iron include red meat, shellfish, eggs, beans, and leafy green vegetables. Foods high in vitamin C include citrus fruits, tomatoes, and broccoli. Ask your doctor if you need to take iron pills or a multivitamin. · Ask your doctor when it is okay to have sex. When should you call for help? Call 911 anytime you think you may need emergency care. For example, call if:  ? · You passed out (lost consciousness). ?Call your doctor now or seek immediate medical care if:  ? · You have severe vaginal bleeding. ? · You are dizzy or lightheaded, or you feel like you may faint. ? · You have new or worse belly or pelvic pain. ? Watch closely for changes in your health, and be sure to contact your doctor if:  ? · Your bleeding gets worse. ? · You think you might be pregnant. ? · You do not get better as expected. Where can you learn more? Go to http://cl-diana.info/. Enter F592 in the search box to learn more about \"Vaginal Bleeding in Nonpregnant Women: Care Instructions. \"  Current as of: October 13, 2016  Content Version: 11.4  © 2579-1531 [employer]. Care instructions adapted under license by [employer] (which disclaims liability or warranty for this information). If you have questions about a medical condition or this instruction, always ask your healthcare professional. [place] [address] any warranty or liability for your use of this information. Premature Heartbeat: Care Instructions  Your Care Instructions    A premature heartbeat happens when the heart beats earlier than it should. This briefly interrupts the heart's rhythm. You do not usually feel the early heartbeat, and the next beat is stronger. To many people, this feels like a skipped heartbeat or a flutter.   If you have no heart problems, premature heartbeats are not a cause for concern. Most people have them at some time. They may happen more often if you drink a lot of caffeine or alcohol or are under stress. Usually, no cause for a premature heartbeat is found, and no treatment is needed. Follow-up care is a key part of your treatment and safety. Be sure to make and go to all appointments, and call your doctor if you are having problems. It's also a good idea to know your test results and keep a list of the medicines you take. How can you care for yourself at home? · Limit caffeine and other stimulants if they trigger premature heartbeats. · Reduce stress. Avoid people and places that make you feel anxious, if you can. Learn ways to reduce stress, such as biofeedback, guided imagery, and meditation. · Do not smoke or allow others to smoke around you. If you need help quitting, talk to your doctor about stop-smoking programs and medicines. These can increase your chances of quitting for good. · Get at least 30 minutes of exercise on most days of the week. Walking is a good choice. You also may want to do other activities, such as running, swimming, cycling, or playing tennis or team sports. · Get enough sleep. Keep your room dark and quiet, and try to go to bed at the same time every night. · Limit alcohol to 2 drinks a day for men and 1 drink a day for women. Too much alcohol can cause health problems. If drinking alcohol causes more premature heartbeats, do not drink it. · If your doctor prescribes medicine, take it exactly as prescribed. Call your doctor if you think you are having a problem with your medicine. When should you call for help? Call 911 anytime you think you may need emergency care. For example, call if:  ? · You passed out (lost consciousness). ?Call your doctor now or seek immediate medical care if:  ? · You are dizzy or lightheaded, or you feel like you may faint. ? · You are short of breath. ? Watch closely for changes in your health, and be sure to contact your doctor if you have any problems. Where can you learn more? Go to http://cl-diana.info/. Enter P415 in the search box to learn more about \"Premature Heartbeat: Care Instructions. \"  Current as of: September 21, 2016  Content Version: 11.4  © 8507-9442 [employer]. Care instructions adapted under license by [employer] (which disclaims liability or warranty for this information). If you have questions about a medical condition or this instruction, always ask your healthcare professional. [address] any warranty or liability for your use of this information.

## 2017-11-13 NOTE — ED NOTES
While at ultrasound patient voided and did not collect urine specimen. Informed Dr. Partida All. Pt is placed back on cardiac monitor. Pt has remained in Normal sinus rhythm the entire ED visit.

## 2017-12-07 ENCOUNTER — DOCUMENTATION ONLY (OUTPATIENT)
Dept: INTERNAL MEDICINE CLINIC | Age: 27
End: 2017-12-07

## 2017-12-07 NOTE — PROGRESS NOTES
Called patient to inform them that [facility] do not accept their insurance. I am sending a list of pain specialists to her house.

## 2017-12-18 ENCOUNTER — OFFICE VISIT (OUTPATIENT)
Dept: SURGERY | Age: 27
End: 2017-12-18

## 2017-12-18 ENCOUNTER — TELEPHONE (OUTPATIENT)
Dept: SURGERY | Age: 27
End: 2017-12-18

## 2017-12-18 VITALS
HEART RATE: 90 BPM | WEIGHT: 293 LBS | SYSTOLIC BLOOD PRESSURE: 132 MMHG | HEIGHT: 66 IN | DIASTOLIC BLOOD PRESSURE: 86 MMHG | TEMPERATURE: 98.9 F | BODY MASS INDEX: 47.09 KG/M2 | RESPIRATION RATE: 16 BRPM | OXYGEN SATURATION: 98 %

## 2017-12-18 DIAGNOSIS — L73.2 HIDRADENITIS SUPPURATIVA: Primary | ICD-10-CM

## 2017-12-18 NOTE — MR AVS SNAPSHOT
Visit Information Date & Time Provider Department Dept. Phone Encounter #  
 12/18/2017 10:40 AM [facility] [phone] [id number] Your Appointments 12/18/2017 10:40 AM  
New Patient with Qing Callahan MD  
[address] ([facility]) Appt Note: NP  Referred by Dr. Nelly Beasley for eval. hidradenitis, both armpits, very bad.    [facility]  
 [address] Replaced by [facility] [id number]  
[address] [place] [address] Upcoming Health Maintenance Date Due Pneumococcal 19-64 Medium Risk (1 of 1 - PPSV23) 10/17/2007 DTaP/Tdap/Td series (1 - Tdap) 10/17/2009 PAP AKA CERVICAL CYTOLOGY 10/17/2009 Influenza Age 5 to Adult 8/1/2017 Allergies as of 12/18/2017  Review Complete On: 12/18/2017 By: Zeina Layton LPN No Known Allergies Current Immunizations  Reviewed on 5/29/2016 No immunizations on file. Not reviewed this visit Vitals BP Pulse Temp Resp Height(growth percentile) Weight(growth percentile) 132/86 (BP 1 Location: Left arm, BP Patient Position: Sitting) 90 98.9 °F (37.2 °C) (Oral) 16 5' 6\" (1.676 m) 302 lb (137 kg) SpO2 BMI OB Status Smoking Status 98% 48.74 kg/m2 Having regular periods Current Every Day Smoker Vitals History BMI and BSA Data Body Mass Index Body Surface Area 48.74 kg/m 2 2.53 m 2 Preferred Pharmacy Pharmacy Name Phone [facility] [phone] [facility], [address] [place] [address] [phone] Your Updated Medication List  
  
   
This list is accurate as of: 12/18/17 10:36 AM.  Always use your most recent med list.  
  
  
  
  
 escitalopram oxalate 10 mg tablet Commonly known as:  Catrachita Skates Take 1 Tab by mouth daily. ferrous sulfate 325 mg (65 mg iron) tablet Take 1 Tab by mouth Daily (before breakfast). lidocaine 5 % Commonly known as:  Joanne Maldonadoten Apply patch to the affected area for 12 hours a day and remove for 12 hours a day. medroxyPROGESTERone 10 mg tablet Commonly known as:  PROVERA 10 mg po tid x day 1-3 then 10 mg po BID day 4-6 and 10 mg po daily x day 7-13  
  
 naproxen 375 mg tablet Commonly known as:  NAPROSYN Take 1 Tab by mouth two (2) times daily (with meals). traZODone 50 mg tablet Commonly known as:  Piotr Orthodox Take 1 Tab by mouth nightly. Introducing [facility]! Марина Goldman introduces Fashiontrot patient portal. Now you can access parts of your medical record, email your doctor's office, and request medication refills online. 1. In your internet browser, go to [email or web address] 2. Click on the First Time User? Click Here link in the Sign In box. You will see the New Member Sign Up page. 3. Enter your Fashiontrot Access Code exactly as it appears below. You will not need to use this code after youve completed the sign-up process. If you do not sign up before the expiration date, you must request a new code. · Fashiontrot Access Code: [email or web address] Expires: 3/18/2018 10:35 AM 
 
4. Enter the last four digits of your Social Security Number (xxxx) and Date of Birth (mm/dd/yyyy) as indicated and click Submit. You will be taken to the next sign-up page. 5. Create a Fashiontrot ID. This will be your Fashiontrot login ID and cannot be changed, so think of one that is secure and easy to remember. 6. Create a Fashiontrot password. You can change your password at any time. 7. Enter your Password Reset Question and Answer. This can be used at a later time if you forget your password. 8. Enter your e-mail address. You will receive e-mail notification when new information is available in [address] 9. Click Sign Up. You can now view and download portions of your medical record.  
10. Click the Download Summary menu link to download a portable copy of your medical information. If you have questions, please visit the Frequently Asked Questions section of the [employer] website. Remember, [employer] is NOT to be used for urgent needs. For medical emergencies, dial 911. Now available from your iPhone and Android! Please provide this summary of care documentation to your next provider. Your primary care clinician is listed as NONE. If you have any questions after today's visit, please call [phone].

## 2017-12-18 NOTE — PROGRESS NOTES
HISTORY OF PRESENT ILLNESS  Juvenal Oconnor is a 32 y.o. female who presents for evaluation of bilateral axillary hidradenitis. HPI Comments: Ms. Charlie Bell tells me that she has been having problems with recurrent abscesses in both her right and left axilla for several years now. She is s/p multiple drainage procedures and courses of abx. Found to have hidradenitis. She has otherwise been in her usual state of health. Past Medical History:  No date: Anemia  No date: Asthma  2016: Discharge from both nipples  No date: Heart abnormality      Comment: Atrial fibrillation   No date: Morbid obesity ([facility] [address]    Past Surgical History:  No date:  DELIVERY ONLY      Comment: c/s x 3  No date: HX GYN  No date: HX HEENT      Comment: tonsilectomy  No date: HX OTHER SURGICAL      Comment: tonsillectomy c-sections +3    Review of patient's family history indicates:    Hypertension                   Father                    Social History: Employment - [address], approx. 5 per day. EtOH - Denies. Review of systems negative except as noted. Review of Systems   Constitutional: Negative for chills and fever. Gastrointestinal: Negative for nausea and vomiting. Musculoskeletal:        Pain in both right and left axilla. Physical Exam   Constitutional: No distress. Obese female. HENT:   Head: Normocephalic and atraumatic. Eyes: No scleral icterus. Neck: Neck supple. Cardiovascular: Normal rate and regular rhythm. Pulmonary/Chest: Effort normal and breath sounds normal.   Abdominal: Soft. She exhibits no distension. There is no tenderness. Musculoskeletal: Normal range of motion. Lymphadenopathy:     She has no cervical adenopathy. Neurological: She is alert. Skin:   Changes in right and left axilla which are c/w hidradenitis. The left axilla is worse than the right. Vitals reviewed.       ASSESSMENT and PLAN  In view of the findings on H and P, Ms. Nayana Lomas should benefit from excision of the hidradenitis from her left axilla and application of acellular xenograft. Discussed procedure with her including risks of bleeding, infection, recurrent disease. Also discussed role of acellular xenograft. She understands and wishes to proceed. I have tentatively scheduled Ms. Johnson for surgery on January 3, 2018 at [facility] and will see her back in the office postoperatively. She is agreeable to this plan of action and is most certainly free to contact the office should any questions or concerns arise.

## 2017-12-18 NOTE — TELEPHONE ENCOUNTER
Patient identified with two patient identifiers. Discussed with Dr. Davie Villeda Patient informed we can not keep her out prior to surgery. We can provide a letter with time out starting at date of surgery. Patient expressed understanding she will call if letter is needed to be faxed or will request at later date.

## 2017-12-18 NOTE — PROGRESS NOTES
1. Have you been to the ER, urgent care clinic since your last visit? Hospitalized since your last visit? No    2. Have you seen or consulted any other health care providers outside of the [address] [place] [address] since your last visit? Include any pap smears or colon screening.  No

## 2017-12-18 NOTE — TELEPHONE ENCOUNTER
Patient is scheduled for Surgery 1/3/18 - excise hidradenitis left axilla / acell xenograft. Patient would like a Work note to provide for her employer that she will be out until surgery and for the time after surgery until her follow up, due to her pain. Please call to confirm details. Thanks!

## 2017-12-19 RX ORDER — BUPIVACAINE HYDROCHLORIDE 2.5 MG/ML
30 INJECTION, SOLUTION EPIDURAL; INFILTRATION; INTRACAUDAL ONCE
Status: CANCELLED | OUTPATIENT
Start: 2017-12-19 | End: 2017-12-19

## 2017-12-21 ENCOUNTER — HOSPITAL ENCOUNTER (OUTPATIENT)
Dept: GENERAL RADIOLOGY | Age: 27
Discharge: HOME | End: 2017-12-21
Payer: COMMERCIAL

## 2017-12-21 ENCOUNTER — HOSPITAL ENCOUNTER (OUTPATIENT)
Dept: PREADMISSION TESTING | Age: 27
Discharge: HOME | End: 2017-12-21
Payer: COMMERCIAL

## 2017-12-21 VITALS
HEIGHT: 65 IN | RESPIRATION RATE: 20 BRPM | SYSTOLIC BLOOD PRESSURE: 113 MMHG | TEMPERATURE: 98.4 F | WEIGHT: 293 LBS | HEART RATE: 79 BPM | BODY MASS INDEX: 48.82 KG/M2 | DIASTOLIC BLOOD PRESSURE: 74 MMHG

## 2017-12-21 LAB
ANION GAP SERPL CALC-SCNC: 8 MMOL/L (ref 5–15)
BASOPHILS # BLD: 0 K/UL (ref 0–0.1)
BASOPHILS NFR BLD: 0 % (ref 0–1)
BUN SERPL-MCNC: 9 MG/DL (ref 6–20)
BUN/CREAT SERPL: 21 (ref 12–20)
CALCIUM SERPL-MCNC: 8.9 MG/DL (ref 8.5–10.1)
CHLORIDE SERPL-SCNC: 107 MMOL/L (ref 97–108)
CO2 SERPL-SCNC: 24 MMOL/L (ref 21–32)
CREAT SERPL-MCNC: 0.42 MG/DL (ref 0.55–1.02)
EOSINOPHIL # BLD: 0 K/UL (ref 0–0.4)
EOSINOPHIL NFR BLD: 1 % (ref 0–7)
ERYTHROCYTE [DISTWIDTH] IN BLOOD BY AUTOMATED COUNT: 16.2 % (ref 11.5–14.5)
GLUCOSE SERPL-MCNC: 101 MG/DL (ref 65–100)
HCT VFR BLD AUTO: 32.7 % (ref 35–47)
HGB BLD-MCNC: 10 G/DL (ref 11.5–16)
LYMPHOCYTES # BLD: 2.7 K/UL (ref 0.8–3.5)
LYMPHOCYTES NFR BLD: 42 % (ref 12–49)
MCH RBC QN AUTO: 25.3 PG (ref 26–34)
MCHC RBC AUTO-ENTMCNC: 30.6 G/DL (ref 30–36.5)
MCV RBC AUTO: 82.8 FL (ref 80–99)
MONOCYTES # BLD: 0.4 K/UL (ref 0–1)
MONOCYTES NFR BLD: 6 % (ref 5–13)
NEUTS SEG # BLD: 3.3 K/UL (ref 1.8–8)
NEUTS SEG NFR BLD: 51 % (ref 32–75)
PLATELET # BLD AUTO: 319 K/UL (ref 150–400)
POTASSIUM SERPL-SCNC: 4.3 MMOL/L (ref 3.5–5.1)
RBC # BLD AUTO: 3.95 M/UL (ref 3.8–5.2)
SODIUM SERPL-SCNC: 139 MMOL/L (ref 136–145)
WBC # BLD AUTO: 6.4 K/UL (ref 3.6–11)

## 2017-12-21 PROCEDURE — 71020 XR CHEST PA LAT: CPT

## 2017-12-21 PROCEDURE — 80048 BASIC METABOLIC PNL TOTAL CA: CPT | Performed by: SURGERY

## 2017-12-21 PROCEDURE — 36415 COLL VENOUS BLD VENIPUNCTURE: CPT | Performed by: SURGERY

## 2017-12-21 PROCEDURE — 85025 COMPLETE CBC W/AUTO DIFF WBC: CPT | Performed by: SURGERY

## 2017-12-22 ENCOUNTER — TELEPHONE (OUTPATIENT)
Dept: SURGERY | Age: 27
End: 2017-12-22

## 2017-12-22 NOTE — TELEPHONE ENCOUNTER
----- Message from Manjit Cooper RN sent at 12/22/2017  9:59 AM EST -----  PLEASE NOTICE ABN. LABS SAI. H&H.   THANKS

## 2018-01-02 ENCOUNTER — ANESTHESIA EVENT (OUTPATIENT)
Dept: MEDSURG UNIT | Age: 28
End: 2018-01-02
Payer: COMMERCIAL

## 2018-01-03 ENCOUNTER — HOSPITAL ENCOUNTER (OUTPATIENT)
Age: 28
Setting detail: OUTPATIENT SURGERY
Discharge: HOME | End: 2018-01-03
Attending: SURGERY | Admitting: SURGERY
Payer: COMMERCIAL

## 2018-01-03 ENCOUNTER — ANESTHESIA (OUTPATIENT)
Dept: MEDSURG UNIT | Age: 28
End: 2018-01-03
Payer: COMMERCIAL

## 2018-01-03 VITALS
HEART RATE: 73 BPM | WEIGHT: 293 LBS | RESPIRATION RATE: 12 BRPM | TEMPERATURE: 97.3 F | BODY MASS INDEX: 48.82 KG/M2 | OXYGEN SATURATION: 97 % | DIASTOLIC BLOOD PRESSURE: 87 MMHG | SYSTOLIC BLOOD PRESSURE: 116 MMHG | HEIGHT: 65 IN

## 2018-01-03 DIAGNOSIS — L73.2 HIDRADENITIS SUPPURATIVA: Primary | ICD-10-CM

## 2018-01-03 LAB — HCG UR QL: NEGATIVE

## 2018-01-03 PROCEDURE — 77030008684 HC TU ET CUF COVD -B: Performed by: ANESTHESIOLOGY

## 2018-01-03 PROCEDURE — 81025 URINE PREGNANCY TEST: CPT

## 2018-01-03 PROCEDURE — 77030011640 HC PAD GRND REM COVD -A: Performed by: SURGERY

## 2018-01-03 PROCEDURE — 74011250636 HC RX REV CODE- 250/636: Performed by: SURGERY

## 2018-01-03 PROCEDURE — 76060000062 HC AMB SURG ANES 1 TO 1.5 HR: Performed by: SURGERY

## 2018-01-03 PROCEDURE — 74011250636 HC RX REV CODE- 250/636

## 2018-01-03 PROCEDURE — 77030009526 HC GEL CARSYN MDII -A: Performed by: SURGERY

## 2018-01-03 PROCEDURE — 76210000035 HC AMBSU PH I REC 1 TO 1.5 HR: Performed by: SURGERY

## 2018-01-03 PROCEDURE — 77030018836 HC SOL IRR NACL ICUM -A: Performed by: SURGERY

## 2018-01-03 PROCEDURE — 76210000050 HC AMBSU PH II REC 0.5 TO 1 HR: Performed by: SURGERY

## 2018-01-03 PROCEDURE — 77030031139 HC SUT VCRL2 J&J -A: Performed by: SURGERY

## 2018-01-03 PROCEDURE — 74011000250 HC RX REV CODE- 250: Performed by: SURGERY

## 2018-01-03 PROCEDURE — 74011000250 HC RX REV CODE- 250

## 2018-01-03 PROCEDURE — 77030002916 HC SUT ETHLN J&J -A: Performed by: SURGERY

## 2018-01-03 PROCEDURE — 76030000001 HC AMB SURG OR TIME 1 TO 1.5: Performed by: SURGERY

## 2018-01-03 PROCEDURE — 74011250637 HC RX REV CODE- 250/637: Performed by: SURGERY

## 2018-01-03 PROCEDURE — 77030020782 HC GWN BAIR PAWS FLX 3M -B

## 2018-01-03 PROCEDURE — 88304 TISSUE EXAM BY PATHOLOGIST: CPT | Performed by: SURGERY

## 2018-01-03 PROCEDURE — 77030026438 HC STYL ET INTUB CARD -A: Performed by: ANESTHESIOLOGY

## 2018-01-03 DEVICE — GRAFT WND 6-LAYER 7X10CM -- CYTAL: Type: IMPLANTABLE DEVICE | Site: AXILLA | Status: FUNCTIONAL

## 2018-01-03 DEVICE — GRAFT MICRO MATRX 1000MG EA --: Type: IMPLANTABLE DEVICE | Site: AXILLA | Status: FUNCTIONAL

## 2018-01-03 RX ORDER — MIDAZOLAM HYDROCHLORIDE 1 MG/ML
1 INJECTION, SOLUTION INTRAMUSCULAR; INTRAVENOUS AS NEEDED
Status: DISCONTINUED | OUTPATIENT
Start: 2018-01-03 | End: 2018-01-03 | Stop reason: HOSPADM

## 2018-01-03 RX ORDER — SODIUM CHLORIDE, SODIUM LACTATE, POTASSIUM CHLORIDE, CALCIUM CHLORIDE 600; 310; 30; 20 MG/100ML; MG/100ML; MG/100ML; MG/100ML
25 INJECTION, SOLUTION INTRAVENOUS CONTINUOUS
Status: DISCONTINUED | OUTPATIENT
Start: 2018-01-03 | End: 2018-01-03 | Stop reason: HOSPADM

## 2018-01-03 RX ORDER — SUCCINYLCHOLINE CHLORIDE 20 MG/ML
INJECTION INTRAMUSCULAR; INTRAVENOUS AS NEEDED
Status: DISCONTINUED | OUTPATIENT
Start: 2018-01-03 | End: 2018-01-03 | Stop reason: HOSPADM

## 2018-01-03 RX ORDER — LIDOCAINE HYDROCHLORIDE 10 MG/ML
0.1 INJECTION, SOLUTION EPIDURAL; INFILTRATION; INTRACAUDAL; PERINEURAL AS NEEDED
Status: DISCONTINUED | OUTPATIENT
Start: 2018-01-03 | End: 2018-01-03 | Stop reason: HOSPADM

## 2018-01-03 RX ORDER — SODIUM CHLORIDE 0.9 % (FLUSH) 0.9 %
5-10 SYRINGE (ML) INJECTION AS NEEDED
Status: DISCONTINUED | OUTPATIENT
Start: 2018-01-03 | End: 2018-01-03 | Stop reason: HOSPADM

## 2018-01-03 RX ORDER — ONDANSETRON 2 MG/ML
INJECTION INTRAMUSCULAR; INTRAVENOUS AS NEEDED
Status: DISCONTINUED | OUTPATIENT
Start: 2018-01-03 | End: 2018-01-03 | Stop reason: HOSPADM

## 2018-01-03 RX ORDER — ONDANSETRON 2 MG/ML
4 INJECTION INTRAMUSCULAR; INTRAVENOUS AS NEEDED
Status: DISCONTINUED | OUTPATIENT
Start: 2018-01-03 | End: 2018-01-03 | Stop reason: HOSPADM

## 2018-01-03 RX ORDER — LIDOCAINE HYDROCHLORIDE 20 MG/ML
INJECTION, SOLUTION EPIDURAL; INFILTRATION; INTRACAUDAL; PERINEURAL AS NEEDED
Status: DISCONTINUED | OUTPATIENT
Start: 2018-01-03 | End: 2018-01-03 | Stop reason: HOSPADM

## 2018-01-03 RX ORDER — SODIUM CHLORIDE 0.9 % (FLUSH) 0.9 %
5-10 SYRINGE (ML) INJECTION EVERY 8 HOURS
Status: DISCONTINUED | OUTPATIENT
Start: 2018-01-03 | End: 2018-01-03 | Stop reason: HOSPADM

## 2018-01-03 RX ORDER — OXYCODONE AND ACETAMINOPHEN 5; 325 MG/1; MG/1
1 TABLET ORAL
Qty: 7 TAB | Refills: 0 | Status: SHIPPED | OUTPATIENT
Start: 2018-01-03 | End: 2018-01-11

## 2018-01-03 RX ORDER — KETOROLAC TROMETHAMINE 30 MG/ML
INJECTION, SOLUTION INTRAMUSCULAR; INTRAVENOUS AS NEEDED
Status: DISCONTINUED | OUTPATIENT
Start: 2018-01-03 | End: 2018-01-03 | Stop reason: HOSPADM

## 2018-01-03 RX ORDER — BUPIVACAINE HYDROCHLORIDE 2.5 MG/ML
30 INJECTION, SOLUTION EPIDURAL; INFILTRATION; INTRACAUDAL ONCE
Status: COMPLETED | OUTPATIENT
Start: 2018-01-03 | End: 2018-01-03

## 2018-01-03 RX ORDER — SODIUM CHLORIDE 9 MG/ML
1000 INJECTION, SOLUTION INTRAVENOUS CONTINUOUS
Status: DISCONTINUED | OUTPATIENT
Start: 2018-01-03 | End: 2018-01-03 | Stop reason: HOSPADM

## 2018-01-03 RX ORDER — FENTANYL CITRATE 50 UG/ML
50 INJECTION, SOLUTION INTRAMUSCULAR; INTRAVENOUS AS NEEDED
Status: DISCONTINUED | OUTPATIENT
Start: 2018-01-03 | End: 2018-01-03 | Stop reason: HOSPADM

## 2018-01-03 RX ORDER — EPHEDRINE SULFATE 50 MG/ML
INJECTION, SOLUTION INTRAVENOUS AS NEEDED
Status: DISCONTINUED | OUTPATIENT
Start: 2018-01-03 | End: 2018-01-03 | Stop reason: HOSPADM

## 2018-01-03 RX ORDER — DEXTROSE, SODIUM CHLORIDE, SODIUM LACTATE, POTASSIUM CHLORIDE, AND CALCIUM CHLORIDE 5; .6; .31; .03; .02 G/100ML; G/100ML; G/100ML; G/100ML; G/100ML
25 INJECTION, SOLUTION INTRAVENOUS CONTINUOUS
Status: DISCONTINUED | OUTPATIENT
Start: 2018-01-03 | End: 2018-01-03 | Stop reason: HOSPADM

## 2018-01-03 RX ORDER — PROPOFOL 10 MG/ML
INJECTION, EMULSION INTRAVENOUS AS NEEDED
Status: DISCONTINUED | OUTPATIENT
Start: 2018-01-03 | End: 2018-01-03 | Stop reason: HOSPADM

## 2018-01-03 RX ORDER — SODIUM CHLORIDE 9 MG/ML
25 INJECTION, SOLUTION INTRAVENOUS CONTINUOUS
Status: DISCONTINUED | OUTPATIENT
Start: 2018-01-03 | End: 2018-01-03 | Stop reason: HOSPADM

## 2018-01-03 RX ORDER — GLYCOPYRROLATE 0.2 MG/ML
INJECTION INTRAMUSCULAR; INTRAVENOUS AS NEEDED
Status: DISCONTINUED | OUTPATIENT
Start: 2018-01-03 | End: 2018-01-03 | Stop reason: HOSPADM

## 2018-01-03 RX ORDER — FENTANYL CITRATE 50 UG/ML
25 INJECTION, SOLUTION INTRAMUSCULAR; INTRAVENOUS
Status: COMPLETED | OUTPATIENT
Start: 2018-01-03 | End: 2018-01-03

## 2018-01-03 RX ORDER — MIDAZOLAM HYDROCHLORIDE 1 MG/ML
INJECTION, SOLUTION INTRAMUSCULAR; INTRAVENOUS AS NEEDED
Status: DISCONTINUED | OUTPATIENT
Start: 2018-01-03 | End: 2018-01-03 | Stop reason: HOSPADM

## 2018-01-03 RX ORDER — HYDROCODONE BITARTRATE AND ACETAMINOPHEN 5; 325 MG/1; MG/1
1 TABLET ORAL
Qty: 8 TAB | Refills: 0 | Status: SHIPPED | OUTPATIENT
Start: 2018-01-03 | End: 2018-01-11

## 2018-01-03 RX ORDER — DIPHENHYDRAMINE HYDROCHLORIDE 50 MG/ML
12.5 INJECTION, SOLUTION INTRAMUSCULAR; INTRAVENOUS AS NEEDED
Status: DISCONTINUED | OUTPATIENT
Start: 2018-01-03 | End: 2018-01-03 | Stop reason: HOSPADM

## 2018-01-03 RX ORDER — MORPHINE SULFATE 10 MG/ML
2 INJECTION, SOLUTION INTRAMUSCULAR; INTRAVENOUS
Status: DISCONTINUED | OUTPATIENT
Start: 2018-01-03 | End: 2018-01-03 | Stop reason: HOSPADM

## 2018-01-03 RX ORDER — HYDROCODONE BITARTRATE AND ACETAMINOPHEN 5; 325 MG/1; MG/1
1 TABLET ORAL
Status: COMPLETED | OUTPATIENT
Start: 2018-01-03 | End: 2018-01-03

## 2018-01-03 RX ORDER — SODIUM CHLORIDE, SODIUM LACTATE, POTASSIUM CHLORIDE, CALCIUM CHLORIDE 600; 310; 30; 20 MG/100ML; MG/100ML; MG/100ML; MG/100ML
INJECTION, SOLUTION INTRAVENOUS
Status: DISCONTINUED | OUTPATIENT
Start: 2018-01-03 | End: 2018-01-03 | Stop reason: HOSPADM

## 2018-01-03 RX ORDER — FENTANYL CITRATE 50 UG/ML
INJECTION, SOLUTION INTRAMUSCULAR; INTRAVENOUS AS NEEDED
Status: DISCONTINUED | OUTPATIENT
Start: 2018-01-03 | End: 2018-01-03 | Stop reason: HOSPADM

## 2018-01-03 RX ORDER — MIDAZOLAM HYDROCHLORIDE 1 MG/ML
0.5 INJECTION, SOLUTION INTRAMUSCULAR; INTRAVENOUS
Status: DISCONTINUED | OUTPATIENT
Start: 2018-01-03 | End: 2018-01-03 | Stop reason: HOSPADM

## 2018-01-03 RX ADMIN — FENTANYL CITRATE 25 MCG: 50 INJECTION, SOLUTION INTRAMUSCULAR; INTRAVENOUS at 11:42

## 2018-01-03 RX ADMIN — FENTANYL CITRATE 50 MCG: 50 INJECTION, SOLUTION INTRAMUSCULAR; INTRAVENOUS at 10:45

## 2018-01-03 RX ADMIN — FENTANYL CITRATE 25 MCG: 50 INJECTION, SOLUTION INTRAMUSCULAR; INTRAVENOUS at 11:25

## 2018-01-03 RX ADMIN — CEFAZOLIN 3 G: 1 INJECTION, POWDER, FOR SOLUTION INTRAMUSCULAR; INTRAVENOUS; PARENTERAL at 10:09

## 2018-01-03 RX ADMIN — EPHEDRINE SULFATE 10 MG: 50 INJECTION, SOLUTION INTRAVENOUS at 11:00

## 2018-01-03 RX ADMIN — SODIUM CHLORIDE, SODIUM LACTATE, POTASSIUM CHLORIDE, CALCIUM CHLORIDE: 600; 310; 30; 20 INJECTION, SOLUTION INTRAVENOUS at 09:54

## 2018-01-03 RX ADMIN — KETOROLAC TROMETHAMINE 30 MG: 30 INJECTION, SOLUTION INTRAMUSCULAR; INTRAVENOUS at 11:00

## 2018-01-03 RX ADMIN — FENTANYL CITRATE 100 MCG: 50 INJECTION, SOLUTION INTRAMUSCULAR; INTRAVENOUS at 10:15

## 2018-01-03 RX ADMIN — FENTANYL CITRATE 25 MCG: 50 INJECTION, SOLUTION INTRAMUSCULAR; INTRAVENOUS at 09:52

## 2018-01-03 RX ADMIN — HYDROCODONE BITARTRATE AND ACETAMINOPHEN 1 TABLET: 5; 325 TABLET ORAL at 12:25

## 2018-01-03 RX ADMIN — LIDOCAINE HYDROCHLORIDE 100 MG: 20 INJECTION, SOLUTION EPIDURAL; INFILTRATION; INTRACAUDAL; PERINEURAL at 10:02

## 2018-01-03 RX ADMIN — FENTANYL CITRATE 25 MCG: 50 INJECTION, SOLUTION INTRAMUSCULAR; INTRAVENOUS at 12:02

## 2018-01-03 RX ADMIN — GLYCOPYRROLATE 0.2 MG: 0.2 INJECTION INTRAMUSCULAR; INTRAVENOUS at 10:03

## 2018-01-03 RX ADMIN — PROPOFOL 200 MG: 10 INJECTION, EMULSION INTRAVENOUS at 10:02

## 2018-01-03 RX ADMIN — SUCCINYLCHOLINE CHLORIDE 160 MG: 20 INJECTION INTRAMUSCULAR; INTRAVENOUS at 10:03

## 2018-01-03 RX ADMIN — FENTANYL CITRATE 25 MCG: 50 INJECTION, SOLUTION INTRAMUSCULAR; INTRAVENOUS at 11:31

## 2018-01-03 RX ADMIN — SODIUM CHLORIDE, SODIUM LACTATE, POTASSIUM CHLORIDE, CALCIUM CHLORIDE: 600; 310; 30; 20 INJECTION, SOLUTION INTRAVENOUS at 10:45

## 2018-01-03 RX ADMIN — MIDAZOLAM HYDROCHLORIDE 2 MG: 1 INJECTION, SOLUTION INTRAMUSCULAR; INTRAVENOUS at 09:51

## 2018-01-03 RX ADMIN — ONDANSETRON 4 MG: 2 INJECTION INTRAMUSCULAR; INTRAVENOUS at 11:00

## 2018-01-03 RX ADMIN — FENTANYL CITRATE 75 MCG: 50 INJECTION, SOLUTION INTRAMUSCULAR; INTRAVENOUS at 10:08

## 2018-01-03 NOTE — IP AVS SNAPSHOT
[address] [facility] [address] [place] [address] 
[phone] Patient: Willie Butler MRN: [id number] :1990 About your hospitalization You were admitted on:  January 3, 2018 You last received care in the:  [facility] PACU You were discharged on:  January 3, 2018 Why you were hospitalized Your primary diagnosis was:  Not on File Follow-up Information Follow up With Details Comments Contact Info Danyell Hartman NP   200 [facility] 102 1400 [address] [place] [address] 
[phone] Your Scheduled Appointments 2018  9:40 AM EST  
POST OP with Amanda Montaño NP  
[place] [address] ([address]) [address] [address] [phone] Discharge Orders None A check dustin indicates which time of day the medication should be taken. My Medications START taking these medications Instructions Each Dose to Equal  
 Morning Noon Evening Bedtime HYDROcodone-acetaminophen 5-325 mg per tablet Commonly known as:  Clearnce Mara Your last dose was: Your next dose is: Take 1 Tab by mouth every four (4) hours as needed for Pain. Max Daily Amount: 6 Tabs. 1 Tab STOP taking these medications BACTRIM PO Where to Get Your Medications Information on where to get these meds will be given to you by the nurse or doctor. ! Ask your nurse or doctor about these medications HYDROcodone-acetaminophen 5-325 mg per tablet My Medications STOP taking these medications BACTRIM PO  
   
  
  
TAKE these medications as instructed Instructions Each Dose to Equal  
 Morning Noon Evening Bedtime HYDROcodone-acetaminophen 5-325 mg per tablet Commonly known as:  Clearnce Mara Your last dose was: Your next dose is: Take 1 Tab by mouth every four (4) hours as needed for Pain. Max Daily Amount: 6 Tabs. 1 Tab Where to Get Your Medications Information on where to get these meds will be given to you by the nurse or doctor. ! Ask your nurse or doctor about these medications HYDROcodone-acetaminophen 5-325 mg per tablet Discharge Instructions Patient Discharge Instructions Lisbeth Zhou / [id number] : 1990 Admitted 1/3/2018 Discharged: 1/3/2018 · It is important that you take the medication exactly as they are prescribed. · Keep your medication in the bottles provided by the pharmacist and keep a list of the medication names, dosages, and times to be taken in your wallet. · Do not take other medications without consulting your doctor. What to do at [facility] Recommended diet: Regular. Recommended activity: No Restrictions. No Driving While Taking [address] Wound Care: Apply gel over adaptic and cover with 4x4's and tape every other day. Start wound care on 2018. Please call [phone] if any questions regarding wound care. If you experience any of the following symptoms Fevers, Chills, Nausea, Vomitting, Redness or Drainage at Surgical Site(s) or Any Other Questions or Concerns Please Call -  [phone]. Follow-up with Dr. Ramila Luo in 10-14 days. Information obtained by : 
I understand that if any problems occur once I am at home I am to contact my physician. I understand and acknowledge receipt of the instructions indicated above. Physician's or R.N.'s Signature                                                                  Date/Time Patient or Representative Signature                                                          Date/Time DISCHARGE SUMMARY from Nurse PATIENT INSTRUCTIONS: 
 
 
F-face looks uneven A-arms unable to move or move unevenly S-speech slurred or non-existent T-time-call 911 as soon as signs and symptoms begin-DO NOT go Back to bed or wait to see if you get better-TIME IS BRAIN. Warning Signs of HEART ATTACK Call 911 if you have these symptoms: 
? Chest discomfort. Most heart attacks involve discomfort in the center of the chest that lasts more than a few minutes, or that goes away and comes back. It can feel like uncomfortable pressure, squeezing, fullness, or pain. ? Discomfort in other areas of the upper body. Symptoms can include pain or discomfort in one or both arms, the back, neck, jaw, or stomach. ? Shortness of breath with or without chest discomfort. ? Other signs may include breaking out in a cold sweat, nausea, or lightheadedness. Don't wait more than five minutes to call [address]! Fast action can save your life. Calling 911 is almost always the fastest way to get lifesaving treatment. Emergency Medical Services staff can begin treatment when they arrive  up to an hour sooner than if someone gets to the hospital by car. The discharge information has been reviewed with the patient. The patient verbalized understanding. Discharge medications reviewed with the patient and appropriate educational materials and side effects teaching were provided. ___________________________________________________________________________________________________________________________________ Introducing \[facility]\"" & HEALTH SERVICES! Dominique Love introduces Scribd patient portal. Now you can access parts of your medical record, email your doctor's office, and request medication refills online. 1. In your internet browser, go to [email or web address] 2. Click on the First Time User? Click Here link in the Sign In box. You will see the New Member Sign Up page. 3. Enter your Scribd Access Code exactly as it appears below. You will not need to use this code after youve completed the sign-up process. If you do not sign up before the expiration date, you must request a new code. · Scribd Access Code: [email or web address] Expires: 3/18/2018 10:35 AM 
 
4. Enter the last four digits of your Social Security Number (xxxx) and Date of Birth (mm/dd/yyyy) as indicated and click Submit. You will be taken to the next sign-up page. 5. Create a Scribd ID. This will be your Scribd login ID and cannot be changed, so think of one that is secure and easy to remember. 6. Create a Scribd password. You can change your password at any time. 7. Enter your Password Reset Question and Answer. This can be used at a later time if you forget your password. 8. Enter your e-mail address. You will receive e-mail notification when new information is available in [address] 9. Click Sign Up. You can now view and download portions of your medical record. 10. Click the Download Summary menu link to download a portable copy of your medical information. If you have questions, please visit the Frequently Asked Questions section of the Scribd website. Remember, Scribd is NOT to be used for urgent needs. For medical emergencies, dial 911. Now available from your iPhone and Android! Providers Seen During Your Hospitalization Provider Specialty Primary office phone Carin Hartman MD General Surgery [phone] Your Primary Care Physician (PCP) Primary Care Physician Office Phone Office Fax [address] [facility] [phone] You are allergic to the following No active allergies Recent Documentation Height Weight BMI OB Status Smoking Status 1.651 m 136.1 kg 49.92 kg/m2 Having regular periods Current Every Day Smoker Emergency Contacts Name Discharge Info Relation Home Work Mobile Joshua Johnson DISCHARGE CAREGIVER [3] Parent [1] [phone] Patient Belongings The following personal items are in your possession at time of discharge: 
  Dental Appliances: None  Visual Aid: Glasses          Jewelry: None (ring to mom)  Clothing:  (clothng in locker) Please provide this summary of care documentation to your next provider. Signatures-by signing, you are acknowledging that this After Visit Summary has been reviewed with you and you have received a copy. Patient Signature:  ____________________________________________________________ Date:  ____________________________________________________________  
  
Triny Tran Provider Signature:  ____________________________________________________________ Date:  ____________________________________________________________

## 2018-01-03 NOTE — ROUTINE PROCESS
Patient: Bert Client MRN: [id number]  SSN: [id number]   YOB: 1990  Age: 32 y.o. Sex: female     Patient is status post Procedure(s):  EXCISION HYDRADENITIS LEFT AXILLA, APPLY ACELLULAR XENOGRAFT  SKIN SUBSTITUTE GRAFT APPLICATION.     Surgeon(s) and Role:     * Bryant Dorantes MD - Primary    Local/Dose/Irrigation:  See mar                  Peripheral IV 01/03/18 Right Antecubital (Active)   Site Assessment Clean, dry, & intact 1/3/2018  9:24 AM   Phlebitis Assessment 0 1/3/2018  9:24 AM   Infiltration Assessment 0 1/3/2018  9:24 AM   Dressing Status Clean, dry, & intact 1/3/2018  9:24 AM   Dressing Type Transparent 1/3/2018  9:24 AM   Hub Color/Line Status Pink 1/3/2018  9:24 AM                           Dressing/Packing:  Wound Axilla Left-DRESSING TYPE: Topical skin adhesive/glue (01/03/18 0900)  Splint/Cast:  ]    Other:

## 2018-01-03 NOTE — DISCHARGE INSTRUCTIONS
Patient Discharge Instructions    Armida Dewey / [id number] : 1990    Admitted 1/3/2018 Discharged: 1/3/2018       · It is important that you take the medication exactly as they are prescribed. · Keep your medication in the bottles provided by the pharmacist and keep a list of the medication names, dosages, and times to be taken in your wallet. · Do not take other medications without consulting your doctor. What to do at Home    Recommended diet: Regular. Recommended activity: No Restrictions. No Driving While Taking Percocet . Wound Care: Apply gel over adaptic and cover with 4x4's and tape every other day. Start wound care on 2018. Please call [phone] if any questions regarding wound care. If you experience any of the following symptoms Fevers, Chills, Nausea, Vomitting, Redness or Drainage at Surgical Site(s) or Any Other Questions or Concerns Please Call -  [phone]. Follow-up with Dr. Katharina Clements in 10-14 days. Information obtained by :  I understand that if any problems occur once I am at home I am to contact my physician. I understand and acknowledge receipt of the instructions indicated above.                                                                                                                                            Physician's or R.N.'s Signature                                                                  Date/Time                                                                                                                                              Patient or Representative Signature                                                          Date/Time      DISCHARGE SUMMARY from Nurse    PATIENT INSTRUCTIONS:    After general anesthesia or intravenous sedation, for 24 hours or while taking prescription Narcotics:  · Limit your activities  · Do not drive and operate hazardous machinery  · Do not make important personal or business decisions  · Do  not drink alcoholic beverages  · If you have not urinated within 8 hours after discharge, please contact your surgeon on call. Report the following to your surgeon:  · Excessive pain, swelling, redness or odor of or around the surgical area  · Temperature over 100.5  · Nausea and vomiting lasting longer than 4 hours or if unable to take medications  · Any signs of decreased circulation or nerve impairment to extremity: change in color, persistent  numbness, tingling, coldness or increase pain  · Any questions    What to do at Home:  Recommended activity: Activity as tolerated and no driving for today,     If you experience any of the following symptoms as aforementioned, please follow up with Pranav Mott . *  Please give a list of your current medications to your Primary Care Provider. *  Please update this list whenever your medications are discontinued, doses are      changed, or new medications (including over-the-counter products) are added. *  Please carry medication information at all times in case of emergency situations. These are general instructions for a healthy lifestyle:    No smoking/ No tobacco products/ Avoid exposure to second hand smoke  Surgeon General's Warning:  Quitting smoking now greatly reduces serious risk to your health. Obesity, smoking, and sedentary lifestyle greatly increases your risk for illness    A healthy diet, regular physical exercise & weight monitoring are important for maintaining a healthy lifestyle    You may be retaining fluid if you have a history of heart failure or if you experience any of the following symptoms:  Weight gain of 3 pounds or more overnight or 5 pounds in a week, increased swelling in our hands or feet or shortness of breath while lying flat in bed. Please call your doctor as soon as you notice any of these symptoms; do not wait until your next office visit.     Recognize signs and symptoms of STROKE:    F-face looks uneven    A-arms unable to move or move unevenly    S-speech slurred or non-existent    T-time-call 911 as soon as signs and symptoms begin-DO NOT go       Back to bed or wait to see if you get better-TIME IS BRAIN. Warning Signs of HEART ATTACK     Call 911 if you have these symptoms:   Chest discomfort. Most heart attacks involve discomfort in the center of the chest that lasts more than a few minutes, or that goes away and comes back. It can feel like uncomfortable pressure, squeezing, fullness, or pain.  Discomfort in other areas of the upper body. Symptoms can include pain or discomfort in one or both arms, the back, neck, jaw, or stomach.  Shortness of breath with or without chest discomfort.  Other signs may include breaking out in a cold sweat, nausea, or lightheadedness. Don't wait more than five minutes to call 911 - MINUTES MATTER! Fast action can save your life. Calling 911 is almost always the fastest way to get lifesaving treatment. Emergency Medical Services staff can begin treatment when they arrive -- up to an hour sooner than if someone gets to the hospital by car. The discharge information has been reviewed with the patient. The patient verbalized understanding. Discharge medications reviewed with the patient and appropriate educational materials and side effects teaching were provided.   ___________________________________________________________________________________________________________________________________

## 2018-01-03 NOTE — ANESTHESIA PREPROCEDURE EVALUATION
Anesthetic History   No history of anesthetic complications            Review of Systems / Medical History  Patient summary reviewed, nursing notes reviewed and pertinent labs reviewed    Pulmonary            Asthma        Neuro/Psych   Within defined limits           Cardiovascular  Within defined limits                     GI/Hepatic/Renal  Within defined limits              Endo/Other        Morbid obesity     Other Findings              Physical Exam    Airway  Mallampati: II  TM Distance: > 6 cm  Neck ROM: normal range of motion   Mouth opening: Normal     Cardiovascular  Regular rate and rhythm,  S1 and S2 normal,  no murmur, click, rub, or gallop             Dental  No notable dental hx       Pulmonary  Breath sounds clear to auscultation               Abdominal  GI exam deferred       Other Findings            Anesthetic Plan    ASA: 3  Anesthesia type: general          Induction: Intravenous  Anesthetic plan and risks discussed with: Patient

## 2018-01-03 NOTE — H&P
Date of Surgery Update:  Nirmal Hutchinson was seen and examined. History and physical has been reviewed. The patient has been examined. There have been no significant clinical changes since the completion of the originally dated History and Physical.  Patient identified by surgeon; surgical site was confirmed by patient and surgeon. Signed By: Elizabeth Middleton MD     January 3, 2018 9:43 AM         Please note from the office and include the additional information below:    Past Medical History  Past Medical History:   Diagnosis Date    Adverse effect of anesthesia     ITCHING    Anemia     Arrhythmia     \"IRR. HR\" NO CARDIO. \"EPISODES OF AFIB. \"    Asthma     Chronic pain     Discharge from both nipples 2016    Heart abnormality     Atrial fibrillation     Ill-defined condition     DEPRESSION    Morbid obesity ([facility] [address]         Past Surgical History  Past Surgical History:   Procedure Laterality Date     DELIVERY ONLY      c/s x 3    HX  SECTION      X 4    HX HEENT      tonsilectomy    HX OTHER SURGICAL      tonsillectomy c-sections +3    HX TUBAL LIGATION          Social History  The patient Nirmal Hutchinson  reports that she has been smoking. She has been smoking about 0.25 packs per day. She has never used smokeless tobacco. She reports that she does not drink alcohol or use illicit drugs.      Family History  Family History   Problem Relation Age of Onset    Hypertension Father

## 2018-01-03 NOTE — ANESTHESIA POSTPROCEDURE EVALUATION
Post-Anesthesia Evaluation and Assessment    Patient: Jesse Ramsey MRN: [id number]  SSN: [id number]    YOB: 1990  Age: 32 y.o. Sex: female       Cardiovascular Function/Vital Signs  Visit Vitals    /71    Pulse 77    Temp 36.3 °C (97.3 °F)    Resp 11    Ht 5' 5\" (1.651 m)    Wt 136.1 kg (300 lb)    SpO2 99%    BMI 49.92 kg/m2       Patient is status post general anesthesia for Procedure(s):  EXCISION HYDRADENITIS LEFT AXILLA, APPLY ACELLULAR XENOGRAFT  SKIN SUBSTITUTE GRAFT APPLICATION. Nausea/Vomiting: None    Postoperative hydration reviewed and adequate. Pain:  Pain Scale 1: Numeric (0 - 10) (01/03/18 1126)  Pain Intensity 1: 10 (01/03/18 1126)   Managed    Neurological Status:   Neuro (WDL): Within Defined Limits (01/03/18 0927)   At baseline    Mental Status and Level of Consciousness: Arousable    Pulmonary Status:   O2 Device: Nasal cannula (01/03/18 1122)   Adequate oxygenation and airway patent    Complications related to anesthesia: None    Post-anesthesia assessment completed.  No concerns    Signed By: Dior Voss MD     January 3, 2018

## 2018-01-03 NOTE — OP NOTES
[address]  OPERATIVE REPORT    [place]  MR#: [id number]  : 1990  ACCOUNT #: [de-identified]   DATE OF SERVICE: 2018    PREOPERATIVE DIAGNOSIS: Hidradenitis, left axilla. POSTOPERATIVE DIAGNOSIS:  Hidradenitis, left axilla. PROCEDURE PERFORMED  1. Excise hidradenitis, left axilla. 2.  Apply acellular xenograft. SURGEON: Williams Farmer MD    ANESTHESIA:  General endotracheal.    ESTIMATED BLOOD LOSS:  Minimal.      FLUIDS: Crystalloid 900 mL. SPECIMEN: Hidradenitis of the left axilla to pathology. DRAINS:  None. COMPLICATIONS:  None. INDICATIONS FOR SURGERY: The patient is a 70-year-old female with hidradenitis of the left axilla. The patient is brought to the operating room at this time for excision of the hidradenitis and  application of acellular Xenograft. The risks of the procedure include but not limited to infection, bleeding and recurrent disease were discussed in detail with the patient. The role of the acellular xenograft was also discussed with the patient as well. She understood and wished to proceed. DESCRIPTION OF PROCEDURE: After consent was obtained, the patient was brought to the operating room where she was placed in the supine position on the operating room table. Following the induction of an adequate level of general anesthesia via the endotracheal tube, compression devices were placed on both lower extremities. The left arm was extended on an arm board, the axilla prepped with Betadine and draped as a sterile field. Local anesthetic was infiltrated and an incision encompassing the hidradenitis was opened sharply. Subcutaneous bleeders were carefully cauterized. The skin and subcutaneous tissues were  excised, passed off the field and submitted for histopathologic evaluation. The excised area measured approximately 3 cm x 10 cm. The wound was inspected and several bleeders cauterized.   The wound was irrigated copiously with saline, inspected and found to be hemostatic. Next, 1000 mg of acellular xenograft powder were brought on the field, mixed with gel and applied to the base of the wound. A hydrated acellular xenograft sheet was then brought on the field, trimmed appropriately and placed over the open wound. Using interrupted 2-0 Vicryl sutures, the acellular xenograft sheet was secured to the skin edge. Another layer of gel and powder mixture was applied over the sheet and a piece of Adaptic was then brought on the field. The Adaptic was trimmed appropriately and placed over the open wound. Again, using interrupted 2-0 and 3-0 Vicryl sutures, the Adaptic was secured to the skin edge. Care was taken to incorporate the Adaptic acellular xenograft sheet to the skin with suture. Another layer of gel was applied over the Adaptic. Local anesthetic was infiltrated. A dry dressing was applied. The patient was awakened from general anesthetic and extubated in the operating room. She was transferred to the stretcher and brought to recovery room in stable condition having tolerated the procedure well. At the conclusion of the procedure, all sponge counts, instrument counts, and needle counts were reported as correct x2.       Leela Mosley MD       [facility] / Cindy Cramer  D: 01/03/2018 11:06     T: 01/03/2018 11:35  JOB #: [id number]  CC: Giuseppe Ashley NP

## 2018-01-10 ENCOUNTER — TELEPHONE (OUTPATIENT)
Dept: SURGERY | Age: 28
End: 2018-01-10

## 2018-01-10 NOTE — TELEPHONE ENCOUNTER
Patient identified with two patient identifiers. Patient 1 week post excision of hidradenitis with application of acell left axilla. Patient states she is calling because she was seen in ER this week she was doing fine the first 3 days after surgery then she states she started having night sweats/hot flashes, increase in pain, and a odor from wound. Per patient she was prescribed 7 pills for pain after surgery which does still have about 3 left, she has been back and forth taking BC powder, tylenol, and ibuprofen for her pain because the narcotic upsets her stomach but those things have not helped her with her pain much at all. Patient states she was seen at [facility] because its closer to home. Patient states her BP was up due to her pain. Patient states the acell is beginning to peel off. Informed patient unfortunately the odor is a normal part of the break down other the graft, also the sutures are dissovable and will sometimes fall out prior to post op appointment. Patient states she has appointment next week and may can hold off until then but she wants something to help her discomfort. Patient informed she needs to be seen in the office to have wound assessed due to ER and needing something else for pain. Pain expressed understanding. Offered patient appointment this evening she states she is unable to make her boyfriend is currently at work. Patient offered appointment for tomorrow also informed her she can come to [address] [place] [address] if she wants to see Dr. Paco Tate. Patient states she is unfamiliar with the area. Informed patient I will transfer to [facility] to schedule time for tomorrow with NP at [facility] [place] location. Patient agreed she will call If any other questions or concerns.

## 2018-01-11 ENCOUNTER — TELEPHONE (OUTPATIENT)
Dept: SURGERY | Age: 28
End: 2018-01-11

## 2018-01-11 ENCOUNTER — OFFICE VISIT (OUTPATIENT)
Dept: SURGERY | Age: 28
End: 2018-01-11

## 2018-01-11 VITALS
DIASTOLIC BLOOD PRESSURE: 80 MMHG | TEMPERATURE: 98.4 F | BODY MASS INDEX: 48.82 KG/M2 | OXYGEN SATURATION: 98 % | HEART RATE: 85 BPM | WEIGHT: 293 LBS | RESPIRATION RATE: 20 BRPM | SYSTOLIC BLOOD PRESSURE: 110 MMHG | HEIGHT: 65 IN

## 2018-01-11 DIAGNOSIS — G89.18 POSTOPERATIVE PAIN: Primary | ICD-10-CM

## 2018-01-11 DIAGNOSIS — Z09 SURGERY FOLLOW-UP: ICD-10-CM

## 2018-01-11 RX ORDER — HYDROMORPHONE HYDROCHLORIDE 2 MG/1
2 TABLET ORAL
Qty: 24 TAB | Refills: 0 | Status: SHIPPED | OUTPATIENT
Start: 2018-01-11 | End: 2018-01-17 | Stop reason: ALTCHOICE

## 2018-01-11 RX ORDER — ONDANSETRON 4 MG/1
4 TABLET, ORALLY DISINTEGRATING ORAL
Qty: 20 TAB | Refills: 0 | Status: SHIPPED | OUTPATIENT
Start: 2018-01-11 | End: 2018-01-25

## 2018-01-11 NOTE — MR AVS SNAPSHOT
Visit Information Date & Time Provider Department Dept. Phone Encounter #  
 1/11/2018 11:00 AM Reagan Kapoor NP [facility] 22 622 [phone] [id number] Your Appointments 1/17/2018  9:40 AM  
POST OP with Harry Martin NP  
[facility] [address] ([address]) Appt Note: 01/03/2018: DC: Excise Hidradenitis Left Axilla; Apply Acellular Xenograft, PO  
 [address] [facility] [address]  
[address] [facility] Upcoming Health Maintenance Date Due Pneumococcal 19-64 Medium Risk (1 of 1 - PPSV23) 10/17/2007 DTaP/Tdap/Td series (1 - Tdap) 10/17/2009 PAP AKA CERVICAL CYTOLOGY 10/17/2009 Influenza Age 5 to Adult 8/1/2017 Allergies as of 1/11/2018  Review Complete On: 1/11/2018 By: Reagan Kapoor NP No Known Allergies Current Immunizations  Reviewed on 5/29/2016 No immunizations on file. Not reviewed this visit You Were Diagnosed With   
  
 Codes Comments Postoperative pain    -  Primary ICD-10-CM: G89.18 
ICD-9-CM: 338.18 Surgery follow-up     ICD-10-CM: [facility] ICD-9-CM: V67.00 Vitals BP Pulse Temp Resp Height(growth percentile) Weight(growth percentile) 110/80 (BP 1 Location: Right arm, BP Patient Position: Sitting) 85 98.4 °F (36.9 °C) (Oral) 20 5' 5\" (1.651 m) 309 lb (140.2 kg) LMP SpO2 Breastfeeding? BMI OB Status Smoking Status 01/01/2018 98% No 51.42 kg/m2 Having regular periods Current Every Day Smoker BMI and BSA Data Body Mass Index Body Surface Area  
 51.42 kg/m 2 2.54 m 2 Preferred Pharmacy Pharmacy Name Phone [address] [phone] Your Updated Medication List  
  
   
This list is accurate as of: 1/11/18  2:31 PM.  Always use your most recent med list.  
  
  
  
 HYDROmorphone 2 mg tablet Commonly known as:  DILAUDID Take 1 Tab by mouth every six (6) hours as needed for Pain. Max Daily Amount: 8 mg. Indications: Pain  
  
 ondansetron 4 mg disintegrating tablet Commonly known as:  ZOFRAN ODT Take 1 Tab by mouth every eight (8) hours as needed for Nausea. Prescriptions Printed Refills HYDROmorphone (DILAUDID) 2 mg tablet 0 Sig: Take 1 Tab by mouth every six (6) hours as needed for Pain. Max Daily Amount: 8 mg. Indications: Pain Class: Print Route: Oral  
  
Prescriptions Sent to Pharmacy Refills  
 ondansetron (ZOFRAN ODT) 4 mg disintegrating tablet 0 Sig: Take 1 Tab by mouth every eight (8) hours as needed for Nausea. Class: Normal  
 Pharmacy: [facility], [address] [facility] Z[address] [facility] #: [phone] Route: Oral  
  
Patient Instructions Hidradenitis Suppurativa: Care Instructions Your Care Instructions Hidradenitis suppurativa (say \"qmn-ddir-el-NY-tus sup-yur-uh-TY-vuh\") is a skin condition that causes lumps on the skin that look like pimples or boils. The lumps are usually painful and can break open and drain blood and bad-smelling pus. The condition can come and go for many years. Treatment for this condition may includeantibiotics and other medicines. You may need surgeryto remove the lumps. Home care includes wearing loose-fitting clothes and washing the area gently. You can help prevent lumps from coming back by staying at a healthy weight and not smoking. Doctors don't know exactly how this condition starts. But they do know that something irritates and inflames the hair follicles, causing them to swell and form lumps. This skin condition can't be spread from person to person (isn't contagious). Follow-up care is a key part of your treatment and safety.  Be sure to make and go to all appointments, and call your doctor if you are having problems. It's also a good idea to know your test results and keep a list of the medicines you take. How can you care for yourself at home? ?Skin care ? · Wash the area every day with mild soap. Use your hands rather than a washcloth or sponge when you wash that part of your body. ? · Leave the affected areas uncovered when you can. If you have lumps that are draining, you can cover them with a bandage or other dressing. Put petroleum jelly (such as Vaseline) on the dressing to help keep it from sticking. ? · Wear-loose fitting clothes that don't rub against the area. Avoid activities that cause skin to rub together. ? · If you have pain, try a warm compress. Soak a towel or washcloth in warm water, wring it out, and place it on the affected skin for about 10 minutes. Medicines ? · Be safe with medicines. Take your medicines exactly as prescribed. Call your doctor if you think you are having a problem with your medicine. You will get more details on the specific medicines your doctor prescribes. ? · If your doctor prescribed antibiotics, take them as directed. Do not stop taking them just because you feel better. You need to take the full course of antibiotics. ? Lifestyle choices ? · If you smoke, think about quitting. Smoking can make the condition worse. If you need help quitting, talk to your doctor about stop-smoking programs and medicines. These can increase your chances of quitting for good. ? · Stay at a healthy weight, or lose weight, by eating healthy foods and being physically active. Being overweight could make this condition worse. When should you call for help? Call your doctor now or seek immediate medical care if: 
? · You have symptoms of infection, such as: 
¨ Increased pain, swelling, warmth, or redness. ¨ Red streaks leading from the area. ¨ Pus draining from the area. ¨ A fever. ?Watch closely for changes in your health, and be sure to contact your doctor if: 
? · You do not get better as expected. Where can you learn more? Go to http://cl-diana.info/. Enter Q342 in the search box to learn more about \"Hidradenitis Suppurativa: Care Instructions. \" Current as of: October 13, 2016 Content Version: 11.4 © 5794-7864 [employer]. Care instructions adapted under license by [employer] (which disclaims liability or warranty for this information). If you have questions about a medical condition or this instruction, always ask your healthcare professional. [address] any warranty or liability for your use of this information. Introducing [facility]! Maikel Marlow introduces [employer] patient portal. Now you can access parts of your medical record, email your doctor's office, and request medication refills online. 1. In your internet browser, go to [email or web address] 2. Click on the First Time User? Click Here link in the Sign In box. You will see the New Member Sign Up page. 3. Enter your [employer] Access Code exactly as it appears below. You will not need to use this code after youve completed the sign-up process. If you do not sign up before the expiration date, you must request a new code. · [employer] Access Code: [email or web address] Expires: 3/18/2018 10:35 AM 
 
4. Enter the last four digits of your Social Security Number (xxxx) and Date of Birth (mm/dd/yyyy) as indicated and click Submit. You will be taken to the next sign-up page. 5. Create a [employer] ID. This will be your [employer] login ID and cannot be changed, so think of one that is secure and easy to remember. 6. Create a [employer] password. You can change your password at any time. 7. Enter your Password Reset Question and Answer. This can be used at a later time if you forget your password. 8. Enter your e-mail address. You will receive e-mail notification when new information is available in [address] 9. Click Sign Up. You can now view and download portions of your medical record. 10. Click the Download Summary menu link to download a portable copy of your medical information. If you have questions, please visit the Frequently Asked Questions section of the [employer] website. Remember, [employer] is NOT to be used for urgent needs. For medical emergencies, dial 911. Now available from your iPhone and Android! Please provide this summary of care documentation to your next provider. Your primary care clinician is listed as Nicolle Machado. If you have any questions after today's visit, please call [phone].

## 2018-01-11 NOTE — TELEPHONE ENCOUNTER
Patient came back into office today to return   Dilaudid script stating it 'works, but not with her situation with the kids and all'. She states she has Percocet 5/325 and is taking two every 4 hours and it is not working for the pain at her surgery site. She is requesting Percocet 10mg. Per Corina Echols, patient can continue to take the Percocet 5/325 , two tablets at a time or take the Dilaudid. I suggested she try to break the Dilaudid in half, and would only be taking 1 mg, which would be less strong. She then stated the Dilaudid sometimes caused itching and her skin to burn. She again requests Percocet 10mg. Per Corina Echols, again patient was told to take two of the Percocet tablets at a time. She has a post op appt. With Dr. Rika Weller next week.

## 2018-01-11 NOTE — PROGRESS NOTES
Subjective:      Tommy Corado is a 32 y.o. female presents for postop care 8 days status post left axilla hidradenitis by Dr. Marko Calderon. She states that she is in [place] of pain\" and Percocet is not helping with the pain. She has been taking Percocet 5/2325 2 tabs every 4 hours along with BC powder and it is not helping with pain. Complains of nausea with the Percocet, she is requesting something stronger for pain.  pulled and reviewed. .     Patient has an advanced directive:    Ms. Guerline Kearns has a reminder for a \"due or due soon\" health maintenance. I have asked that she contact her primary care provider for follow-up on this health maintenance. Objective:     Visit Vitals    /80 (BP 1 Location: Right arm, BP Patient Position: Sitting)    Pulse 85    Temp 98.4 °F (36.9 °C) (Oral)    Resp 20    Ht 5' 5\" (1.651 m)    Wt 309 lb (140.2 kg)    LMP 01/01/2018    SpO2 98%    Breastfeeding No    BMI 51.42 kg/m2       General:  alert, cooperative, no distress, mild distress       Incision:   acell and adaptic intact, beefy red tissue noted under adaptic. Sutures intact. 1 suture loose at 11 o'clock     Assessment:     Post-op pain. Plan:     1. Prescription for Dilaudid rx'd to patient. Dilaudid 2 mg  1 tab every 6 hours as needed for pain #24  2. Follow up next week for sutures and acell removal with Dr. Marko Calderon. Pt verbalized understanding and questions were answered to the best of my knowledge and ability.

## 2018-01-11 NOTE — PATIENT INSTRUCTIONS
Hidradenitis Suppurativa: Care Instructions  Your Care Instructions    Hidradenitis suppurativa (say \"nup-njlc-yf-NY-tus sup-nanette-uh-TY-vuh\") is a skin condition that causes lumps on the skin that look like pimples or boils. The lumps are usually painful and can break open and drain blood and bad-smelling pus. The condition can come and go for many years. Treatment for this condition may includeantibiotics and other medicines. You may need surgeryto remove the lumps. Home care includes wearing loose-fitting clothes and washing the area gently. You can help prevent lumps from coming back by staying at a healthy weight and not smoking. Doctors don't know exactly how this condition starts. But they do know that something irritates and inflames the hair follicles, causing them to swell and form lumps. This skin condition can't be spread from person to person (isn't contagious). Follow-up care is a key part of your treatment and safety. Be sure to make and go to all appointments, and call your doctor if you are having problems. It's also a good idea to know your test results and keep a list of the medicines you take. How can you care for yourself at home? ?Skin care  ? · Wash the area every day with mild soap. Use your hands rather than a washcloth or sponge when you wash that part of your body. ? · Leave the affected areas uncovered when you can. If you have lumps that are draining, you can cover them with a bandage or other dressing. Put petroleum jelly (such as Vaseline) on the dressing to help keep it from sticking. ? · Wear-loose fitting clothes that don't rub against the area. Avoid activities that cause skin to rub together. ? · If you have pain, try a warm compress. Soak a towel or washcloth in warm water, wring it out, and place it on the affected skin for about 10 minutes. Medicines  ? · Be safe with medicines. Take your medicines exactly as prescribed.  Call your doctor if you think you are having a problem with your medicine. You will get more details on the specific medicines your doctor prescribes. ? · If your doctor prescribed antibiotics, take them as directed. Do not stop taking them just because you feel better. You need to take the full course of antibiotics. ? Lifestyle choices  ? · If you smoke, think about quitting. Smoking can make the condition worse. If you need help quitting, talk to your doctor about stop-smoking programs and medicines. These can increase your chances of quitting for good. ? · Stay at a healthy weight, or lose weight, by eating healthy foods and being physically active. Being overweight could make this condition worse. When should you call for help? Call your doctor now or seek immediate medical care if:  ? · You have symptoms of infection, such as:  ¨ Increased pain, swelling, warmth, or redness. ¨ Red streaks leading from the area. ¨ Pus draining from the area. ¨ A fever. ? Watch closely for changes in your health, and be sure to contact your doctor if:  ? · You do not get better as expected. Where can you learn more? Go to http://cl-diana.info/. Enter D054 in the search box to learn more about \"Hidradenitis Suppurativa: Care Instructions. \"  Current as of: October 13, 2016  Content Version: 11.4  © 8210-4041 [employer]. Care instructions adapted under license by [employer] (which disclaims liability or warranty for this information). If you have questions about a medical condition or this instruction, always ask your healthcare professional. [place] [address] any warranty or liability for your use of this information.

## 2018-01-11 NOTE — PROGRESS NOTES
Patient c/o left axilla hidradenitis pain. 1. Have you been to the ER, urgent care clinic since your last visit? Hospitalized since your last visit? Yes, [facility] on 1/9/18 for left axilla incisional pain. Patient was given oxycodone-acetaminophen 5/325 # 20 pills. Patient states did not help. 2. Have you seen or consulted any other health care providers outside of the [address] [place] [address] since your last visit? Include any pap smears or colon screening.  No

## 2018-01-17 ENCOUNTER — OFFICE VISIT (OUTPATIENT)
Dept: SURGERY | Age: 28
End: 2018-01-17

## 2018-01-17 VITALS
SYSTOLIC BLOOD PRESSURE: 130 MMHG | BODY MASS INDEX: 48.82 KG/M2 | HEART RATE: 92 BPM | TEMPERATURE: 99.2 F | RESPIRATION RATE: 20 BRPM | DIASTOLIC BLOOD PRESSURE: 84 MMHG | OXYGEN SATURATION: 98 % | WEIGHT: 293 LBS | HEIGHT: 65 IN

## 2018-01-17 DIAGNOSIS — Z51.89 VISIT FOR WOUND CHECK: ICD-10-CM

## 2018-01-17 DIAGNOSIS — Z09 SURGICAL FOLLOW-UP CARE: Primary | ICD-10-CM

## 2018-01-17 DIAGNOSIS — G89.18 POST-OP PAIN: ICD-10-CM

## 2018-01-17 DIAGNOSIS — L73.2 HIDRADENITIS SUPPURATIVA: ICD-10-CM

## 2018-01-17 RX ORDER — SULFAMETHOXAZOLE AND TRIMETHOPRIM 800; 160 MG/1; MG/1
1 TABLET ORAL 2 TIMES DAILY
Qty: 7 TAB | Refills: 0 | Status: SHIPPED | OUTPATIENT
Start: 2018-01-17 | End: 2018-01-25

## 2018-01-17 RX ORDER — OXYCODONE AND ACETAMINOPHEN 5; 325 MG/1; MG/1
1 TABLET ORAL
Qty: 12 TAB | Refills: 0 | Status: SHIPPED | OUTPATIENT
Start: 2018-01-17 | End: 2018-01-25

## 2018-01-17 NOTE — LETTER
NOTIFICATION OF RETURN TO WORK  
 
1/17/2018 10:15 AM 
 
Ms. Anthony [address] [id number] Le[place]yl[place] To Whom It May Concern: 
 
Raj was under the care of [address] [place] [address] from January 3, 2018 to present She will be able to return to work on February 1, 2018 If there are questions or concerns please have the patient contact our office.  
 
Sincerely, 
 
 
 
 
Tracy Franklin NP

## 2018-01-17 NOTE — PATIENT INSTRUCTIONS
Acute Pain After Surgery: Care Instructions  Your Care Instructions  It's common to have some pain after surgery. Pain doesn't mean that something is wrong or that the surgery didn't go well. But when the pain is severe, it's important to work with your doctor to manage it. It's also important to be aware of a few facts about pain and pain medicine. · You are the only person who knows what your pain feels like. So be sure to tell your doctor when you are in pain or when the pain changes. Then he or she will know how to adjust your medicines. · Pain is often easier to control right after it starts. So it may be better to take regular doses of pain medicine and not wait until the pain gets bad. · Medicine can help control pain. But this doesn't mean you'll have no pain. Medicine works to keep the pain at a level you can live with. With time, you will feel better. Follow-up care is a key part of your treatment and safety. Be sure to make and go to all appointments, and call your doctor if you are having problems. It's also a good idea to know your test results and keep a list of the medicines you take. How can you care for yourself at home? · Be safe with medicines. Read and follow all instructions on the label. ¨ If the doctor gave you a prescription medicine for pain, take it as prescribed. ¨ If you are not taking a prescription pain medicine, ask your doctor if you can take an over-the-counter medicine. · If you take an over-the-counter pain medicine, such as acetaminophen (Tylenol), ibuprofen (Advil, Motrin), or naproxen (Aleve), read and follow all instructions on the label. · Do not take two or more pain medicines at the same time unless the doctor told you to. · Do not drink alcohol while you are taking pain medicines. · Try to walk each day if your doctor recommends it. Start by walking a little more than you did the day before. Bit by bit, increase the amount you walk.  Walking increases blood flow. It also helps prevent pneumonia and constipation. · To prevent constipation from opioid pain medicines:  ¨ Talk to your doctor about a laxative. ¨ Include fruits, vegetables, beans, and whole grains in your diet each day. These foods are high in fiber. ¨ Drink plenty of fluids, enough so that your urine is light yellow or clear like water. Drink water, fruit juice, or other drinks that do not contain caffeine or alcohol. If you have kidney, heart, or liver disease and have to limit fluids, talk with your doctor before you increase the amount of fluids you drink. ¨ Take a fiber supplement, such as Citrucel or Metamucil, every day if needed. Read and follow all instructions on the label. If you take pain medicine for more than a few days, talk to your doctor before you take fiber. When should you call for help? Call your doctor now or seek immediate medical care if:  ? · Your pain gets worse. ? · Your pain is not controlled by medicine. ? Watch closely for changes in your health, and be sure to contact your doctor if you have any problems. Where can you learn more? Go to http://cl-diana.info/. Enter ([phone] 832-130 in the search box to learn more about \"Acute Pain After Surgery: Care Instructions. \"  Current as of: March 20, 2017  Content Version: 11.4  © 9229-6037 [employer]. Care instructions adapted under license by [employer] (which disclaims liability or warranty for this information). If you have questions about a medical condition or this instruction, always ask your healthcare professional. [place] [address] any warranty or liability for your use of this information.

## 2018-01-17 NOTE — MR AVS SNAPSHOT
[address] [facility] [id number] [phone] Patient: Kathe Delaney MRN: [id number] :1990 Visit Information Date & Time Provider Department Dept. Phone Encounter #  
 2018  9:40 AM Lara Walton NP [facility] [phone] [id number] Follow-up Instructions Return in about 2 weeks (around 2018). Follow-up and Disposition History Upcoming Health Maintenance Date Due Pneumococcal 19-64 Medium Risk (1 of 1 - PPSV23) 10/17/2007 DTaP/Tdap/Td series (1 - Tdap) 10/17/2009 PAP AKA CERVICAL CYTOLOGY 10/17/2009 Influenza Age 5 to Adult 2017 Allergies as of 2018  Review Complete On: 2018 By: Lara Walton NP No Known Allergies Current Immunizations  Reviewed on 2016 No immunizations on file. Not reviewed this visit You Were Diagnosed With   
  
 Codes Comments Surgical follow-up care    -  Primary ICD-10-CM: Z99 ICD-9-CM: V67.00 Post-op pain     ICD-10-CM: G89.18 
ICD-9-CM: 338.18 Visit for wound check     ICD-10-CM: Z51.89 ICD-9-CM: V58.89 Hidradenitis suppurativa     ICD-10-CM: L73.2 ICD-9-CM: 705.83 Vitals BP Pulse Temp Resp Height(growth percentile) Weight(growth percentile) 130/84 92 99.2 °F (37.3 °C) 20 5' 5\" (1.651 m) 306 lb (138.8 kg) LMP SpO2 BMI OB Status Smoking Status 2018 98% 50.92 kg/m2 Having regular periods Current Every Day Smoker BMI and BSA Data Body Mass Index Body Surface Area 50.92 kg/m 2 2.52 m 2 Preferred Pharmacy Pharmacy Name Phone [phone] [phone] Your Updated Medication List  
  
   
This list is accurate as of: 18 11:38 AM.  Always use your most recent med list.  
  
  
  
  
 ondansetron 4 mg disintegrating tablet Commonly known as:  ZOFRAN ODT Take 1 Tab by mouth every eight (8) hours as needed for Nausea. oxyCODONE-acetaminophen 5-325 mg per tablet Commonly known as:  PERCOCET Take 1 Tab by mouth every eight (8) hours as needed for Pain. Max Daily Amount: 3 Tabs. trimethoprim-sulfamethoxazole 160-800 mg per tablet Commonly known as:  BACTRIM DS, SEPTRA DS Take 1 Tab by mouth two (2) times a day. Prescriptions Printed Refills  
 trimethoprim-sulfamethoxazole (BACTRIM DS, SEPTRA DS) 160-800 mg per tablet 0 Sig: Take 1 Tab by mouth two (2) times a day. Class: Print Route: Oral  
 oxyCODONE-acetaminophen (PERCOCET) 5-325 mg per tablet 0 Sig: Take 1 Tab by mouth every eight (8) hours as needed for Pain. Max Daily Amount: 3 Tabs. Class: Print Route: Oral  
  
Follow-up Instructions Return in about 2 weeks (around 1/31/2018). Patient Instructions Acute Pain After Surgery: Care Instructions Your Care Instructions It's common to have some pain after surgery. Pain doesn't mean that something is wrong or that the surgery didn't go well. But when the pain is severe, it's important to work with your doctor to manage it. It's also important to be aware of a few facts about pain and pain medicine. · You are the only person who knows what your pain feels like. So be sure to tell your doctor when you are in pain or when the pain changes. Then he or she will know how to adjust your medicines. · Pain is often easier to control right after it starts. So it may be better to take regular doses of pain medicine and not wait until the pain gets bad. · Medicine can help control pain. But this doesn't mean you'll have no pain. Medicine works to keep the pain at a level you can live with. With time, you will feel better. Follow-up care is a key part of your treatment and safety.  Be sure to make and go to all appointments, and call your doctor if you are having problems. It's also a good idea to know your test results and keep a list of the medicines you take. How can you care for yourself at home? · Be safe with medicines. Read and follow all instructions on the label. ¨ If the doctor gave you a prescription medicine for pain, take it as prescribed. ¨ If you are not taking a prescription pain medicine, ask your doctor if you can take an over-the-counter medicine. · If you take an over-the-counter pain medicine, such as acetaminophen (Tylenol), ibuprofen (Advil, Motrin), or naproxen (Aleve), read and follow all instructions on the label. · Do not take two or more pain medicines at the same time unless the doctor told you to. · Do not drink alcohol while you are taking pain medicines. · Try to walk each day if your doctor recommends it. Start by walking a little more than you did the day before. Bit by bit, increase the amount you walk. Walking increases blood flow. It also helps prevent pneumonia and constipation. · To prevent constipation from opioid pain medicines: ¨ Talk to your doctor about a laxative. ¨ Include fruits, vegetables, beans, and whole grains in your diet each day. These foods are high in fiber. ¨ Drink plenty of fluids, enough so that your urine is light yellow or clear like water. Drink water, fruit juice, or other drinks that do not contain caffeine or alcohol. If you have kidney, heart, or liver disease and have to limit fluids, talk with your doctor before you increase the amount of fluids you drink. ¨ Take a fiber supplement, such as Citrucel or Metamucil, every day if needed. Read and follow all instructions on the label. If you take pain medicine for more than a few days, talk to your doctor before you take fiber. When should you call for help? Call your doctor now or seek immediate medical care if: 
? · Your pain gets worse. ? · Your pain is not controlled by medicine. ? Watch closely for changes in your health, and be sure to contact your doctor if you have any problems. Where can you learn more? Go to http://cl-diana.info/. Enter ([phone] in the search box to learn more about \"Acute Pain After Surgery: Care Instructions. \" Current as of: March 20, 2017 Content Version: 11.4 © 7385-2657 [employer]. Care instructions adapted under license by [employer] (which disclaims liability or warranty for this information). If you have questions about a medical condition or this instruction, always ask your healthcare professional. [address] any warranty or liability for your use of this information. Introducing [facility]! Connie Funes introduces jslyhl patient portal. Now you can access parts of your medical record, email your doctor's office, and request medication refills online. 1. In your internet browser, go to [email or web address] 2. Click on the First Time User? Click Here link in the Sign In box. You will see the New Member Sign Up page. 3. Enter your jslyhl Access Code exactly as it appears below. You will not need to use this code after youve completed the sign-up process. If you do not sign up before the expiration date, you must request a new code. · jslyhl Access Code: [email or web address] Expires: 3/18/2018 10:35 AM 
 
4. Enter the last four digits of your Social Security Number (xxxx) and Date of Birth (mm/dd/yyyy) as indicated and click Submit. You will be taken to the next sign-up page. 5. Create a Zattikkat ID. This will be your jslyhl login ID and cannot be changed, so think of one that is secure and easy to remember. 6. Create a jslyhl password. You can change your password at any time. 7. Enter your Password Reset Question and Answer. This can be used at a later time if you forget your password. 8. Enter your e-mail address. You will receive e-mail notification when new information is available in [address] 9. Click Sign Up. You can now view and download portions of your medical record. 10. Click the Download Summary menu link to download a portable copy of your medical information. If you have questions, please visit the Frequently Asked Questions section of the Schoo website. Remember, Schoo is NOT to be used for urgent needs. For medical emergencies, dial 911. Now available from your iPhone and Android! Please provide this summary of care documentation to your next provider. Your primary care clinician is listed as Nicolle Machado. If you have any questions after today's visit, please call [phone].

## 2018-01-17 NOTE — PROGRESS NOTES
1. Have you been to the ER, urgent care clinic since your last visit? Hospitalized since your last visit? No    2. Have you seen or consulted any other health care providers outside of the [address] [place] [address] since your last visit? Include any pap smears or colon screening.  No

## 2018-01-17 NOTE — PROGRESS NOTES
Subjective:   Carmen Dennis is a 32year old that is 2 weeks s/p  Excise hidradenitis, left axilla. Apply acellular xenograft with Dr. Courtney Licea. Patient comes in office for wound check. Patient stated she has been having a lot of drainage and pain from surgical site. Patient requesting refill on Percocet for pain. Stated she didn't take Dilaudid that was prescribed due to causing nausea and causing drowsiness. Denies fever, no chills, no chest pain. Stated she has been apply dry dressing over Acell daily and as needed. Also stated she has another abscess forming to area proximal to surgical wound. Stated very tender and having redness. Objective:     Blood pressure 130/84, pulse 92, temperature 99.2 °F (37.3 °C), resp. rate 20, height 5' 5\" (1.651 m), weight 306 lb (138.8 kg), last menstrual period 01/01/2018, SpO2 98 %, not currently breastfeeding. Wound:  Location: axilla(e):left    Acell removed, area cleaned with saline, wound gel applied. Wound with pink granulating tissue, no bleeding, no drainage. No odor, no signs of infection. Presence of nodule above surgical wound with no drainage, presence of erythema, tender. Assessment:     2 weeks s/p  Excise hidradenitis, left axilla. Apply acellular xenograft   Plan:     1. Wound care discussed. Advised patient to clean left axilla with use of antibacterial soap, rinse well, and pat dry. Apply dry dressing to area daily. 2. Empirically treating patient with Bactrim for abscess formation. 3. One time refill given on Percocet for pain, also advised may use over the counter Tylenol or Motrin. 4. Follow-up in office in 2 weeks. Return to work note given. Patient verbalized understanding and questions were answered to the best of my knowledge and ability. Advised if any questions or concerns to call the office.

## 2018-01-25 ENCOUNTER — HOSPITAL ENCOUNTER (EMERGENCY)
Age: 28
Discharge: HOME | End: 2018-01-25
Attending: EMERGENCY MEDICINE | Admitting: EMERGENCY MEDICINE
Payer: COMMERCIAL

## 2018-01-25 VITALS
BODY MASS INDEX: 48.82 KG/M2 | OXYGEN SATURATION: 100 % | TEMPERATURE: 98.7 F | SYSTOLIC BLOOD PRESSURE: 147 MMHG | RESPIRATION RATE: 12 BRPM | WEIGHT: 293 LBS | HEIGHT: 65 IN | HEART RATE: 99 BPM | DIASTOLIC BLOOD PRESSURE: 101 MMHG

## 2018-01-25 DIAGNOSIS — Z71.6 TOBACCO ABUSE COUNSELING: ICD-10-CM

## 2018-01-25 DIAGNOSIS — L02.412 ABSCESS OF AXILLA, LEFT: Primary | ICD-10-CM

## 2018-01-25 DIAGNOSIS — R03.0 ELEVATED BLOOD PRESSURE READING: ICD-10-CM

## 2018-01-25 PROCEDURE — 75810000289 HC I&D ABSCESS SIMP/COMP/MULT

## 2018-01-25 PROCEDURE — 74011000250 HC RX REV CODE- 250: Performed by: PHYSICIAN ASSISTANT

## 2018-01-25 PROCEDURE — 99282 EMERGENCY DEPT VISIT SF MDM: CPT

## 2018-01-25 RX ORDER — DOXYCYCLINE HYCLATE 100 MG
100 TABLET ORAL 2 TIMES DAILY
Qty: 14 TAB | Refills: 0 | Status: SHIPPED | OUTPATIENT
Start: 2018-01-25 | End: 2018-02-01

## 2018-01-25 RX ORDER — IBUPROFEN 800 MG/1
800 TABLET ORAL
Qty: 20 TAB | Refills: 0 | Status: SHIPPED | OUTPATIENT
Start: 2018-01-25 | End: 2018-02-01

## 2018-01-25 RX ORDER — LIDOCAINE HYDROCHLORIDE 20 MG/ML
10 INJECTION, SOLUTION EPIDURAL; INFILTRATION; INTRACAUDAL; PERINEURAL
Status: COMPLETED | OUTPATIENT
Start: 2018-01-25 | End: 2018-01-25

## 2018-01-25 RX ORDER — HYDROCODONE BITARTRATE AND ACETAMINOPHEN 5; 325 MG/1; MG/1
1 TABLET ORAL
Qty: 10 TAB | Refills: 0 | Status: SHIPPED | OUTPATIENT
Start: 2018-01-25 | End: 2018-01-25 | Stop reason: CLARIF

## 2018-01-25 RX ORDER — OXYCODONE AND ACETAMINOPHEN 5; 325 MG/1; MG/1
1 TABLET ORAL
Qty: 8 TAB | Refills: 0 | Status: SHIPPED | OUTPATIENT
Start: 2018-01-25 | End: 2019-06-16

## 2018-01-25 RX ADMIN — LIDOCAINE HYDROCHLORIDE 200 MG: 20 INJECTION, SOLUTION INTRAVENOUS at 17:51

## 2018-01-25 NOTE — DISCHARGE INSTRUCTIONS
Skin Abscess: Care Instructions  Your Care Instructions    A skin abscess is a bacterial infection that forms a pocket of pus. A boil is a kind of skin abscess. The doctor may have cut an opening in the abscess so that the pus can drain out. You may have gauze in the cut so that the abscess will stay open and keep draining. You may need antibiotics. You will need to follow up with your doctor to make sure the infection has gone away. The doctor has checked you carefully, but problems can develop later. If you notice any problems or new symptoms, get medical treatment right away. Follow-up care is a key part of your treatment and safety. Be sure to make and go to all appointments, and call your doctor if you are having problems. It's also a good idea to know your test results and keep a list of the medicines you take. How can you care for yourself at home? · Apply warm and dry compresses, a heating pad set on low, or a hot water bottle 3 or 4 times a day for pain. Keep a cloth between the heat source and your skin. · If your doctor prescribed antibiotics, take them as directed. Do not stop taking them just because you feel better. You need to take the full course of antibiotics. · Take pain medicines exactly as directed. ¨ If the doctor gave you a prescription medicine for pain, take it as prescribed. ¨ If you are not taking a prescription pain medicine, ask your doctor if you can take an over-the-counter medicine. · Keep your bandage clean and dry. Change the bandage whenever it gets wet or dirty, or at least one time a day. · If the abscess was packed with gauze:  ¨ Keep follow-up appointments to have the gauze changed or removed. If the doctor instructed you to remove the gauze, gently pull out all of the gauze when your doctor tells you to. ¨ After the gauze is removed, soak the area in warm water for 15 to 20 minutes 2 times a day, until the wound closes. When should you call for help?   Call your doctor now or seek immediate medical care if:  ? · You have signs of worsening infection, such as:  ¨ Increased pain, swelling, warmth, or redness. ¨ Red streaks leading from the infected skin. ¨ Pus draining from the wound. ¨ A fever. ? Watch closely for changes in your health, and be sure to contact your doctor if:  ? · You do not get better as expected. Where can you learn more? Go to http://cl-diana.info/. Enter P657 in the search box to learn more about \"Skin Abscess: Care Instructions. \"  Current as of: October 13, 2016  Content Version: 11.4  © 5301-3559 [employer]. Care instructions adapted under license by [employer] (which disclaims liability or warranty for this information). If you have questions about a medical condition or this instruction, always ask your healthcare professional. [place] [address] any warranty or liability for your use of this information. Learning About Benefits From Quitting Smoking  How does quitting smoking make you healthier? If you're thinking about quitting smoking, you may have a few reasons to be smoke-free. Your health may be one of them. · When you quit smoking, you lower your risks for cancer, lung disease, heart attack, stroke, blood vessel disease, and blindness from macular degeneration. · When you're smoke-free, you get sick less often, and you heal faster. You are less likely to get colds, flu, bronchitis, and pneumonia. · As a nonsmoker, you may find that your mood is better and you are less stressed. When and how will you feel healthier? Quitting has real health benefits that start from day 1 of being smoke-free. And the longer you stay smoke-free, the healthier you get and the better you feel. The first hours  · After just 20 minutes, your blood pressure and heart rate go down. That means there's less stress on your heart and blood vessels.   · Within 12 hours, the level of carbon monoxide in your blood drops back to normal. That makes room for more oxygen. With more oxygen in your body, you may notice that you have more energy than when you smoked. After 2 weeks  · Your lungs start to work better. · Your risk of heart attack starts to drop. After 1 month  · When your lungs are clear, you cough less and breathe deeper, so it's easier to be active. · Your sense of taste and smell return. That means you can enjoy food more than you have since you started smoking. Over the years  · After 1 year, your risk of heart disease is half what it would be if you kept smoking. · After 5 years, your risk of stroke starts to shrink. Within a few years after that, it's about the same as if you'd never smoked. · After 10 years, your risk of dying from lung cancer is cut by about half. And your risk for many other types of cancer is lower too. How would quitting help others in your life? When you quit smoking, you improve the health of everyone who now breathes in your smoke. · Their heart, lung, and cancer risks drop, much like yours. · They are sick less. For babies and small children, living smoke-free means they're less likely to have ear infections, pneumonia, and bronchitis. · If you're a woman who is or will be pregnant someday, quitting smoking means a healthier . · Children who are close to you are less likely to become adult smokers. Where can you learn more? Go to http://cl-diana.info/. Enter [phone] [id number] 11 in the search box to learn more about \"Learning About Benefits From Quitting Smoking. \"  Current as of: 2017  Content Version: 11.4  © 7524-7906 [employer]. Care instructions adapted under license by [employer] (which disclaims liability or warranty for this information).  If you have questions about a medical condition or this instruction, always ask your healthcare professional. [address]  any warranty or liability for your use of this information.

## 2018-01-25 NOTE — ED PROVIDER NOTES
EMERGENCY DEPARTMENT HISTORY AND PHYSICAL EXAM      Date: 1/25/2018  Patient Name: Michel Ware    History of Presenting Illness     Chief Complaint   Patient presents with    Post OP Complication     had glands removed from LEFT axilla 2 weeks ago, pt states she has had fevers off and on at home and the site now has \"boils\" on it and it \"looks infected\"       History Provided By: Patient    HPI: Michel Ware, 32 y.o. female with PMHx significant for anemia, asthma, arrhythmia, depression, presents ambulatory to the ED with cc of left axilla pain as well as boils under left axilla, present x 4-5 days. Patient describes her pain as \"excrutiating\". She denies any modifying factors. She reports associated sx of subjective fever and chills. Per chart review, patient underwent surgery with Dr Rosita Headley on January 3rd for excision of hidradenitis under left axilla. Per chart review, patient followed up with Dr Rosita Headley on January 11th. Patient denies seeing Dr. Rosita Headley since that time. She reports seeing his NP \"1 week ago\" complaining of a boil next to the incision site. Today, patient presents with 2 boils next to the incision site. She admits to attempting to drain one of the boils at home. She states that she has finished a Bactrim rx that the NP prescribed her. She reports taking Tylenol for her fever. When asked if she is aware that the surgical site/wound is open and that the skin edges are not held together, patient states \"I don't look at it, I can't tell you how long it's been like that\". Patient states that she believes the stitches were removed \"maybe a week ago\". She denies using any other pain medication currently. She denies vomiting, nausea, abdominal pain. Patient does smoke, denies alcohol/tobacco use. PCP: Sorin Vicente NP    There are no other complaints, changes, or physical findings at this time.         Past History     Past Medical History:  Past Medical History:   Diagnosis Date    Adverse effect of anesthesia     ITCHING    Anemia     Arrhythmia     \"IRR. HR\" NO CARDIO. \"EPISODES OF AFIB. \"    Asthma     Chronic pain     Discharge from both nipples 2016    Heart abnormality     Atrial fibrillation     Ill-defined condition     DEPRESSION    Morbid obesity ([facility] [address]        Past Surgical History:  Past Surgical History:   Procedure Laterality Date     DELIVERY ONLY      c/s x 3    HX  SECTION      X 4    HX HEENT      tonsilectomy    HX OTHER SURGICAL      tonsillectomy c-sections +3    HX OTHER SURGICAL  2018     Excise hidradenitis, left axilla.  HX TUBAL LIGATION         Family History:  Family History   Problem Relation Age of Onset    Hypertension Father        Social History:  Social History   Substance Use Topics    Smoking status: Current Every Day Smoker     Packs/day: 0.25    Smokeless tobacco: Never Used    Alcohol use No       Allergies:  No Known Allergies      Review of Systems   Review of Systems   Constitutional: Positive for chills and fever (subjective). Negative for activity change, appetite change, diaphoresis and unexpected weight change. HENT: Negative for congestion, hearing loss, rhinorrhea, sinus pressure, sneezing, sore throat and trouble swallowing. Eyes: Negative for pain, redness, itching and visual disturbance. Respiratory: Negative for cough, shortness of breath and wheezing. Cardiovascular: Negative for chest pain, palpitations and leg swelling. Gastrointestinal: Negative for abdominal pain, constipation, diarrhea, nausea and vomiting. Genitourinary: Negative for dysuria. Musculoskeletal: Negative for arthralgias, gait problem and myalgias. Skin: Positive for wound. Negative for color change, pallor and rash. Neurological: Negative for tremors, weakness, light-headedness, numbness and headaches. All other systems reviewed and are negative.       Physical Exam   Physical Exam Constitutional: She is oriented to person, place, and time. Vital signs are normal. She appears well-developed and well-nourished. No distress. 32 y.o.  female in NAD  Communicates appropriately and in full sentences  Normal vital signs   HENT:   Head: Normocephalic and atraumatic. Eyes: Conjunctivae are normal. Pupils are equal, round, and reactive to light. Right eye exhibits no discharge. Left eye exhibits no discharge. Neck: Normal range of motion. Neck supple. No nuchal rigidity   Cardiovascular: Normal rate, regular rhythm and intact distal pulses. Pulmonary/Chest: Effort normal and breath sounds normal. No respiratory distress. She has no wheezes. Abdominal: Soft. Bowel sounds are normal. She exhibits no distension. There is no tenderness. Musculoskeletal: Normal range of motion. She exhibits no edema, tenderness or deformity. No neurologic, motor, vascular, or compartment embarrassment observed on exam. No focal neurologic deficits. Neurological: She is alert and oriented to person, place, and time. Coordination normal.   Skin: Skin is warm and dry. No rash noted. She is not diaphoretic. No pallor. LEFT AXILLA:  + 5cm x 1cm shallow wound; no erythema, warmth or induration noted  + 2 small 1cm abscesses to upper left axilla; no active drainage; 1 of the abscesses has signs of previous drainage   Psychiatric:   + Anxious  + Tearful   Nursing note and vitals reviewed. Medical Decision Making   I am the first provider for this patient. I reviewed the vital signs, available nursing notes, past medical history, past surgical history, family history and social history. Vital Signs-Reviewed the patient's vital signs. Patient Vitals for the past 12 hrs:   Temp Pulse Resp BP SpO2   01/25/18 1700 98.7 °F (37.1 °C) 99 12 (!) 147/101 100 %         Records Reviewed:  Old Medical Records    Provider Notes (Medical Decision Making):   DDX: abscess, folliculitis, pilondial cyst, cellulitis, hiradenitis suppurativa, poor hygiene    Healthy Female presents complaining of swelling in her left axilla with 2 boils. Initially treated with OTC medications at home without relief. No history of MRSA or obvious infection and pt is not immunocompromised. Pt has no fever or chills. In the ER is not toxic appearing. Will initiate I&D of the abscess and discharge patient with appropriate ABX. HYPERTENSION COUNSELING  Patient denies chest pain, headache, shortness of breath. Patient is made aware of their elevated blood pressure and is instructed to follow up this week with their PCP for a blood pressure recheck. Patient is counseled regarding consequences of chronic, uncontrolled hypertension including kidney disease, vision disturbances, heart disease, stroke or even death. Patient states their understanding and agrees to follow up this week. TOBACCO COUNSELING:  Upon evaluation, pt expressed that they are a current tobacco user. Pt has been counseled on the dangers of smoking and was encouraged to quit as soon as possible in order to decrease further risks to their health. Pt has conveyed their understanding of the risks involved should they continue to use tobacco products. Procedure Note - Incision and Drainage:   5:47 PM  Performed by: Roberta Devine PA-C  Complexity: complex  Skin prepped with sirclens. Sterile field established. Anesthesia achieved with 15 mLs of Lidocaine 2% without epinephrine using a local infiltration. Abscess to axilla(e):left (upper) was incised with # 11 blade, and 3-5mLs of purulent drainage was expressed. Wound probed and irrigated. Patient refused packing to the area. Sterile dressing applied. Estimated blood loss: less than 2ml  The procedure took 1-15 minutes, and pt tolerated well. Procedure Note - Incision and Drainage:   5:47 PM  Performed by: Roberta Devine PA-C  Complexity: complex  Skin prepped with sirclens.   Sterile field established. Anesthesia achieved with 15 mLs of Lidocaine 2% without epinephrine using a local infiltration. Abscess to axilla(e):left (lower) was incised with # 11 blade, and 1mLs of purulent drainage was expressed. Wound probed and irrigated. Patient refused packing to the area. Sterile dressing applied. Estimated blood loss: less than 2ml  The procedure took 1-15 minutes, and pt tolerated well. ED Course:   Initial assessment performed. The patients presenting problems have been discussed, and they are in agreement with the care plan formulated and outlined with them. I have encouraged them to ask questions as they arise throughout their visit. Medications   lidocaine (PF) (XYLOCAINE) 20 mg/mL (2 %) injection 200 mg (200 mg IntraDERMal Given by Provider 1/25/18 8689)        Disposition:  Discharge Note:  6:31 PM  The pt is ready for discharge. The pt's signs, symptoms, diagnosis, and discharge instructions have been discussed and pt has conveyed their understanding. The pt is to follow up as recommended or return to ER should their symptoms worsen. Plan has been discussed and pt is in agreement. This note is prepared by Imer Andrader, acting as a Scribe for [employer], [employer], [place]: The scribe's documentation has been prepared under my direction and personally reviewed by me in its entirety. I confirm that the notes above accurately reflects all work, treatment, procedures, and medical decision making performed by me. PLAN:  1. Discharge Medication List as of 1/25/2018  6:29 PM        2.    Follow-up Information     Follow up With Details Comments Contact Info    Melany Banks NP Schedule an appointment as soon as possible for a visit in 2 days As needed, If symptoms worsen, Possible further evaluation and treatment [address] [place] [id number] [phone]      [facility] EMERGENCY DEPT Go to As needed, If symptoms worsen [address] [facility]  [phone]        Return to ED if worse     Diagnosis     Clinical Impression:   1. Abscess of axilla, left    2. Tobacco abuse counseling    3. Elevated blood pressure reading        Attestations: This note is prepared by Anum Nuñez, acting as a Scribe for [address]: The scribe's documentation has been prepared under my direction and personally reviewed by me in its entirety. I confirm that the notes above accurately reflects all work, treatment, procedures, and medical decision making performed by me. This note will not be viewable in [address]

## 2018-11-06 ENCOUNTER — OFFICE VISIT (OUTPATIENT)
Dept: FAMILY MEDICINE CLINIC | Age: 28
End: 2018-11-06

## 2018-11-06 VITALS
WEIGHT: 293 LBS | HEART RATE: 87 BPM | SYSTOLIC BLOOD PRESSURE: 124 MMHG | TEMPERATURE: 98.4 F | OXYGEN SATURATION: 96 % | DIASTOLIC BLOOD PRESSURE: 85 MMHG | BODY MASS INDEX: 48.82 KG/M2 | RESPIRATION RATE: 20 BRPM | HEIGHT: 65 IN

## 2018-11-06 DIAGNOSIS — R53.1 WEAKNESS: Primary | ICD-10-CM

## 2018-11-06 DIAGNOSIS — L73.2 HIDRADENITIS SUPPURATIVA: ICD-10-CM

## 2018-11-06 DIAGNOSIS — R12 HEARTBURN: ICD-10-CM

## 2018-11-06 DIAGNOSIS — E66.01 MORBID OBESITY WITH BMI OF 45.0-49.9, ADULT (HCC): ICD-10-CM

## 2018-11-06 DIAGNOSIS — R10.30 LOWER ABDOMINAL PAIN: ICD-10-CM

## 2018-11-06 LAB
BILIRUB UR QL STRIP: NEGATIVE
GLUCOSE POC: 110 MG/DL
GLUCOSE UR-MCNC: NEGATIVE MG/DL
KETONES P FAST UR STRIP-MCNC: NEGATIVE MG/DL
PH UR STRIP: 7 [PH] (ref 4.6–8)
PROT UR QL STRIP: NEGATIVE
SP GR UR STRIP: 1.01 (ref 1–1.03)
UA UROBILINOGEN AMB POC: NORMAL (ref 0.2–1)
URINALYSIS CLARITY POC: CLEAR
URINALYSIS COLOR POC: YELLOW
URINE BLOOD POC: NORMAL
URINE LEUKOCYTES POC: NEGATIVE
URINE NITRITES POC: NEGATIVE

## 2018-11-06 RX ORDER — PANTOPRAZOLE SODIUM 40 MG/1
40 TABLET, DELAYED RELEASE ORAL DAILY
Qty: 30 TAB | Refills: 1 | Status: SHIPPED | OUTPATIENT
Start: 2018-11-06 | End: 2019-06-16

## 2018-11-06 NOTE — PROGRESS NOTES
Chief Complaint   Patient presents with    Abdominal Pain     8/10    ED Follow-up     Seen @ [facility] on 10/21/18     Pt seen in the office today for c/o of abd pain 8/10 with nausea no vomiting.   Pt reports bright red blood in her stool  Pt states \"I am in a lot of pain\", pt will not elaborate on what her needs and concerns are

## 2018-11-06 NOTE — PROGRESS NOTES
Results for orders placed or performed in visit on 11/06/18   AMB POC URINALYSIS DIP STICK AUTO W/O MICRO   Result Value Ref Range    Color (UA POC) Yellow     Clarity (UA POC) Clear     Glucose (UA POC) Negative Negative    Bilirubin (UA POC) Negative Negative    Ketones (UA POC) Negative Negative    Specific gravity (UA POC) 1.010 1.001 - 1.035    Blood (UA POC) Trace Negative    pH (UA POC) 7.0 4.6 - 8.0    Protein (UA POC) Negative Negative    Urobilinogen (UA POC) 1 mg/dL 0.2 - 1    Nitrites (UA POC) Negative Negative    Leukocyte esterase (UA POC) Negative Negative

## 2018-11-06 NOTE — PROGRESS NOTES
Chief Complaint   Patient presents with    Abdominal Pain     8/10    ED Follow-up     Seen @ [facility] on 10/21/18     Pt reports that she has been having right breast pain, pt reports that she thinks the pain is related to her hidradenitis. Pt reports that pain has been present for 4 months. Pt uses hot compresses. Pt reports that she had surgery in the past on her left axilla, did not have surgery on the right. Pt reports that surgery was done by Dr Dahlia López. Pt reports that she has not followed up with the surgeon due to frustration that there are still some symptoms present on the left . Pt reports that she has been on multiple rounds of antibiotics, >5, with drainage of different areas since January. Pt reports that nothing makes it better. Pt reports that she also has drainage from her nipple that looks like her abscess drainage. Second Problem:   Pt reports a sharp and sometimes pain in her upper abdomen that comes and goes. First presented a month ago, no known triggers, laying on her stomach makes it worse, certain positions make her nauseated. Pt was seen at [facility] ER, had labs and imaging with no acute findings. Pt reports that she does get heartburn, does not take any medication. Subjective: (As above and below)     Chief Complaint   Patient presents with    Abdominal Pain     8/10    ED Follow-up     Seen @ [facility] on 10/21/18     she is a 25y.o. year old female who presents for evaluation. Reviewed PmHx, RxHx, FmHx, SocHx, AllgHx and updated in chart.     Review of Systems - negative except as listed above    Objective:     Vitals:    18 1421   BP: 124/85   Pulse: 87   Resp: 20   Temp: 98.4 °F (36.9 °C)   TempSrc: Oral   SpO2: 96%   Weight: 297 lb (134.7 kg)   Height: 5' 5\" (1.651 m)     Physical Examination: General appearance - alert, well appearing, and in no distress  Mental status - normal mood, behavior, speech, dress, motor activity, and thought processes  Mouth - mucous membranes moist, pharynx normal without lesions  Chest - clear to auscultation, no wheezes, rales or rhonchi, symmetric air entry  Heart - normal rate, regular rhythm, normal S1, S2, no murmurs, rubs, clicks or gallops  Skin - HS in bilateral axilla, right greater than left    Assessment/ Plan:   1. Weakness  -reviewed results  - AMB POC GLUCOSE BLOOD, BY GLUCOSE MONITORING DEVICE  - AMB POC URINALYSIS DIP STICK AUTO W/O MICRO    2. Lower abdominal pain  -WNL  - AMB POC URINALYSIS DIP STICK AUTO W/O MICRO    3. Hidradenitis suppurativa  -refer to surgeon for treatment  - REFERRAL TO GENERAL SURGERY    4. Morbid obesity with BMI of 45.0-49.9, adult ([facility] [address]  -continue to work on diet and exercise  - REFERRAL TO GENERAL SURGERY    5. Heartburn  -trial of medication to help with symptoms  - pantoprazole (PROTONIX) 40 mg tablet; Take 1 Tab by mouth daily. Dispense: 30 Tab; Refill: 1       Follow-up Disposition: As needed  I have discussed the diagnosis with the patient and the intended plan as seen in the above orders. The patient has received an after-visit summary and questions were answered concerning future plans.      Medication Side Effects and Warnings were discussed with patient: yes  Patient Labs were reviewed: yes  Patient Past Records were reviewed:  yes    Vini Shook M.D.

## 2019-06-16 ENCOUNTER — HOSPITAL ENCOUNTER (EMERGENCY)
Age: 29
Discharge: HOME | End: 2019-06-16
Attending: EMERGENCY MEDICINE | Admitting: EMERGENCY MEDICINE
Payer: COMMERCIAL

## 2019-06-16 ENCOUNTER — APPOINTMENT (OUTPATIENT)
Dept: GENERAL RADIOLOGY | Age: 29
End: 2019-06-16
Attending: PHYSICIAN ASSISTANT
Payer: COMMERCIAL

## 2019-06-16 VITALS
BODY MASS INDEX: 50.85 KG/M2 | HEART RATE: 97 BPM | RESPIRATION RATE: 18 BRPM | HEIGHT: 63 IN | OXYGEN SATURATION: 100 % | WEIGHT: 287 LBS | SYSTOLIC BLOOD PRESSURE: 122 MMHG | TEMPERATURE: 98.2 F | DIASTOLIC BLOOD PRESSURE: 75 MMHG

## 2019-06-16 DIAGNOSIS — S62.346A CLOSED NONDISPLACED FRACTURE OF BASE OF FIFTH METACARPAL BONE OF RIGHT HAND, INITIAL ENCOUNTER: Primary | ICD-10-CM

## 2019-06-16 PROCEDURE — 99284 EMERGENCY DEPT VISIT MOD MDM: CPT

## 2019-06-16 PROCEDURE — 74011250637 HC RX REV CODE- 250/637: Performed by: PHYSICIAN ASSISTANT

## 2019-06-16 PROCEDURE — 75810000053 HC SPLINT APPLICATION

## 2019-06-16 PROCEDURE — 73630 X-RAY EXAM OF FOOT: CPT

## 2019-06-16 RX ORDER — IBUPROFEN 400 MG/1
800 TABLET ORAL
Status: COMPLETED | OUTPATIENT
Start: 2019-06-16 | End: 2019-06-16

## 2019-06-16 RX ORDER — TRAMADOL HYDROCHLORIDE 50 MG/1
50 TABLET ORAL
Qty: 10 TAB | Refills: 0 | Status: SHIPPED | OUTPATIENT
Start: 2019-06-16 | End: 2019-06-19

## 2019-06-16 RX ORDER — IBUPROFEN 800 MG/1
800 TABLET ORAL
Qty: 30 TAB | Refills: 0 | Status: SHIPPED | OUTPATIENT
Start: 2019-06-16 | End: 2019-06-26

## 2019-06-16 RX ADMIN — IBUPROFEN 800 MG: 400 TABLET ORAL at 12:00

## 2019-06-16 NOTE — ED NOTES
Pt has left her shoes in the ED  Have tried to call on the telephone numbers available  Message on pt cellphone the mailbox is full  Message at the home and emergency contact is the wireless customer is not available.

## 2019-06-16 NOTE — ED PROVIDER NOTES
EMERGENCY DEPARTMENT HISTORY AND PHYSICAL EXAM      Date: 2019  Patient Name: Phyllis Reddy    History of Presenting Illness     Chief Complaint   Patient presents with    Foot Pain     right foot pain      History Provided By: Patient    HPI: Phyllis Reddy, 25 y.o. female presents by POV to the ED with cc of right foot pain. She notes that she sustained an injury just prior to arrival.  She went to walk out of her house and her children had left a scooter in the way. She stepped in the scooter, fell to the ground, and had immediate pain to the right foot. The pain is a constant pain that is nonradiating. She is been able to ambulate with difficulty and increased pain since falling. There is no treatment prior to arrival.  She denies prior injuries to the right foot. There are no other injuries due to the fall. There are no other complaints, changes, or physical findings at this time. Social Hx: Tobacco (1/3 ppd), EtOH (social), Illicit drug use (denies)     PCP: Serena Field MD    No current facility-administered medications on file prior to encounter. No current outpatient medications on file prior to encounter. Past History     Past Medical History:  Past Medical History:   Diagnosis Date    Adverse effect of anesthesia     ITCHING    Anemia     Arrhythmia     \"IRR. HR\" NO CARDIO. \"EPISODES OF AFIB. \"    Asthma     Chronic pain     Discharge from both nipples 2016    Heart abnormality     Atrial fibrillation     Ill-defined condition     DEPRESSION    Morbid obesity ([facility] [address]        Past Surgical History:  Past Surgical History:   Procedure Laterality Date     DELIVERY ONLY      c/s x 3    HX  SECTION      X 4    HX HEENT      tonsilectomy    HX OTHER SURGICAL      tonsillectomy c-sections +3    HX OTHER SURGICAL  2018     Excise hidradenitis, left axilla.      HX TUBAL LIGATION         Family History:  Family History   Problem Relation Age of Onset    Hypertension Father        Social History:  Social History     Tobacco Use    Smoking status: Current Every Day Smoker     Packs/day: 0.25    Smokeless tobacco: Never Used   Substance Use Topics    Alcohol use: No    Drug use: No       Allergies:  No Known Allergies      Review of Systems   Review of Systems   Constitutional: Negative for chills, diaphoresis and fever. HENT: Negative for congestion, ear pain, rhinorrhea and sore throat. Respiratory: Negative for cough and shortness of breath. Cardiovascular: Negative for chest pain. Gastrointestinal: Negative for abdominal pain, constipation, diarrhea, nausea and vomiting. Genitourinary: Negative for difficulty urinating, dysuria, frequency and hematuria. Musculoskeletal: Positive for arthralgias and gait problem. Negative for myalgias. Neurological: Negative for headaches. All other systems reviewed and are negative. Physical Exam   Physical Exam   Constitutional: She is oriented to person, place, and time. She appears well-developed and well-nourished. No distress. Pleasant, overweight 25 y.o. -American female    HENT:   Head: Normocephalic and atraumatic. Eyes: Conjunctivae are normal. Right eye exhibits no discharge. Left eye exhibits no discharge. Neck: Normal range of motion. Neck supple. Cardiovascular: Normal rate and intact distal pulses. Pulmonary/Chest: Effort normal.   Musculoskeletal:   Right foot: Swelling to the lateral aspect of the foot with tenderness palpation of the base of the fifth metatarsal.  No ecchymosis or deformity. Full range of motion of the ankle and toes. Distal neurovascular status intact. Cap refill less than 2 seconds. Palpable pedal pulses. Neurological: She is alert and oriented to person, place, and time. Skin: Skin is warm and dry. She is not diaphoretic. Psychiatric: She has a normal mood and affect.  Her behavior is normal.   Nursing note and vitals reviewed. Diagnostic Study Results     Labs - None    Radiologic Studies -   XR FOOT RT MIN 3 V   Final Result   IMPRESSION: Fifth metatarsal base fracture. Medical Decision Making   I am the first provider for this patient. I reviewed the vital signs, available nursing notes, past medical history, past surgical history, family history and social history. Vital Signs-Reviewed the patient's vital signs. Patient Vitals for the past 12 hrs:   Temp Pulse Resp BP SpO2   06/16/19 1131 98.2 °F (36.8 °C) 97 18 122/75 100 %       Records Reviewed: Nursing Notes    Provider Notes (Medical Decision Making):   Fracture, sprain, strain, contusion,     ED Course:   Initial assessment performed. The patients presenting problems have been discussed, and they are in agreement with the care plan formulated and outlined with them. I have encouraged them to ask questions as they arise throughout their visit. Tobacco Counseling  Discussed the risks of smoking and the benefits of smoking cessation as well as the long term sequelae of smoking with the patient. The patient verbalized their understanding. Counseled patient for approximately 3 minutes. 12:43 PM  The patient is unable to bear weight on her foot. However, she has adamantly refused crutches. She notes a Hx of Hidrosadenitis and does not want anything to touch her armpits. 12:52 PM  The patient has changed her mind and notes that she would now like crutches due to the pain in foot. Critical Care Time: None    Disposition:  DISCHARGE NOTE:  12:47 PM  The pt is ready for discharge. The pt's signs, symptoms, diagnosis, and discharge instructions have been discussed and pt has conveyed their understanding. The pt is to follow up as recommended or return to ER should their symptoms worsen. Plan has been discussed and pt is in agreement. PLAN:  1.    Current Discharge Medication List      START taking these medications    Details ibuprofen (MOTRIN) 800 mg tablet Take 1 Tab by mouth every eight (8) hours as needed for Pain for up to 10 days. Qty: 30 Tab, Refills: 0      traMADol (ULTRAM) 50 mg tablet Take 1 Tab by mouth every six (6) hours as needed for Pain for up to 3 days. Max Daily Amount: 200 mg. Qty: 10 Tab, Refills: 0    Associated Diagnoses: Closed nondisplaced fracture of base of fifth metacarpal bone of right hand, initial encounter           2. Follow-up Information     Follow up With Specialties Details Why Contact Info    Conchita Boyd MD Orthopedic Surgery In 3 days  [address]  [facility]  [phone]          Return to ED if worse     Diagnosis     Clinical Impression:   1. Closed nondisplaced fracture of base of fifth metacarpal bone of right hand, initial encounter          Please note that this dictation was completed with [employer], the computer voice recognition software. Quite often unanticipated grammatical, syntax, homophones, and other interpretive errors are inadvertently transcribed by the computer software. Please disregards these errors. Please excuse any errors that have escaped final proofreading. This note will not be viewable in [address]

## 2019-06-16 NOTE — DISCHARGE INSTRUCTIONS
Patient Education        Fifth Metatarsal Bynum Fracture: Care Instructions  Your Care Instructions    A fifth metatarsal fracture is a break or a thin, hairline crack in the long bone on the outside of the foot. A Bynum fracture occurs near the end of this bone that is closest to the ankle. This type of fracture can happen when a person jumps or changes direction quickly and twists the foot or ankle the wrong way. Or it can happen from repeated stress on the bones of the foot. Treatment depends on how bad the fracture is. You may or may not have had surgery. Your doctor may have put your foot in a cast to keep it stable. A splint may be used in some cases if there is a lot of swelling. You may have been given crutches to use to keep weight off your foot. Your doctor may recommend that you keep weight off the foot for several weeks. The fracture may take 6 weeks to several months to heal. It is important to give your foot time to heal completely so that you don't hurt it again. Do not return to your usual activities until your doctor says you can. Your doctor may suggest that you get physical therapy to help regain strength and range of motion in your foot. You heal best when you take good care of yourself. Eat a variety of healthy foods, and don't smoke. Follow-up care is a key part of your treatment and safety. Be sure to make and go to all appointments, and call your doctor if you are having problems. It's also a good idea to know your test results and keep a list of the medicines you take. How can you care for yourself at home? · Be safe with medicines. Read and follow all instructions on the label. ? If the doctor gave you a prescription medicine for pain, take it as prescribed. ? If you are not taking a prescription pain medicine, ask your doctor if you can take an over-the-counter medicine.   · Follow your doctor's instructions about how much weight you can put on your foot and when you can go back to your usual activities. If you were given crutches, use them as directed. · Put ice or a cold pack on your foot for 10 to 20 minutes at a time. Try to do this every 1 to 2 hours for the next 3 days (when you are awake) or until the swelling goes down. Put a thin cloth between the ice and your skin. · Prop up your foot on a pillow when you ice it or anytime you sit or lie down for the next 3 days. Try to keep it above the level of your heart. This will help reduce swelling. Cast and splint care  · If your foot is in a cast or splint, follow the cast or splint care instructions your doctor gives you. If you have a removable fiberglass walking cast or a splint, do not take it off unless your doctor tells you to. · Keep your cast or splint dry. If you have a removable fiberglass walking cast or a splint, ask your doctor if it is okay to remove it to bathe. Your doctor may want you to keep it on as much as possible. · If you are told to keep your cast or splint on, tape a sheet of plastic to cover it when you bathe. Water under the cast or splint can cause your skin to itch and hurt. · Never cut your cast or stick anything down inside it to scratch an itch on your leg. When should you call for help? Call 911 anytime you think you may need emergency care. For example, call if:    · You have symptoms of a blood clot in your lung (called a pulmonary embolism). These may include:  ? Sudden chest pain. ? Trouble breathing. ? Coughing up blood.     · You passed out (lost consciousness).    Call your doctor now or seek immediate medical care if:    · You have problems with your cast or splint. For example:  ? The skin under the cast or splint is burning or stinging. ? The cast or splint feels too tight. ? There is a lot of swelling near the cast or splint. (Some swelling is normal.)  ? You have a new fever. ? There is drainage or a bad smell coming from the cast or splint.     · You have increased or severe pain.   · You have tingling, weakness, or numbness in your foot and toes.     · You cannot move your toes.     · Your foot turns cold or changes color.    Watch closely for changes in your health, and be sure to contact your doctor if:    · The pain does not get better day by day.     · You do not get better as expected. Where can you learn more? Go to http://cl-diana.info/. Enter E048 in the search box to learn more about \"Fifth Metatarsal Bynum Fracture: Care Instructions. \"  Current as of: September 20, 2018  Content Version: 11.9  © 2773-3866 [employer]. Care instructions adapted under license by [employer] (which disclaims liability or warranty for this information). If you have questions about a medical condition or this instruction, always ask your healthcare professional. [address] any warranty or liability for your use of this information.

## 2019-09-19 ENCOUNTER — HOSPITAL ENCOUNTER (OUTPATIENT)
Dept: GENERAL RADIOLOGY | Age: 29
Discharge: HOME | End: 2019-09-19
Payer: COMMERCIAL

## 2019-09-19 DIAGNOSIS — M79.641 RIGHT HAND PAIN: ICD-10-CM

## 2019-09-19 PROCEDURE — 73130 X-RAY EXAM OF HAND: CPT

## 2019-09-25 ENCOUNTER — TELEPHONE (OUTPATIENT)
Dept: CARDIOLOGY CLINIC | Age: 29
End: 2019-09-25

## 2019-09-25 NOTE — TELEPHONE ENCOUNTER
Came in this am , not feeling well , AFIB and iregular heart beat. Thought her appt was for today. Appt is tomorrow, patient spoke with nurse.             Thanks

## 2019-09-25 NOTE — TELEPHONE ENCOUNTER
I spoke with the patient. We offered her an appointment for today and she refused due to other commitments. She was told to go to the ED for evaluation if she began to feel lightheaded, weak, short of breath and any chest discomfort. She acknowledged understanding.

## 2019-09-26 ENCOUNTER — OFFICE VISIT (OUTPATIENT)
Dept: CARDIOLOGY CLINIC | Age: 29
End: 2019-09-26

## 2019-09-26 VITALS
SYSTOLIC BLOOD PRESSURE: 117 MMHG | WEIGHT: 286 LBS | OXYGEN SATURATION: 97 % | BODY MASS INDEX: 50.68 KG/M2 | DIASTOLIC BLOOD PRESSURE: 71 MMHG | RESPIRATION RATE: 16 BRPM | HEART RATE: 84 BPM | HEIGHT: 63 IN

## 2019-09-26 DIAGNOSIS — G47.30 SLEEP-DISORDERED BREATHING: ICD-10-CM

## 2019-09-26 DIAGNOSIS — I48.19 PERSISTENT ATRIAL FIBRILLATION (HCC): Primary | ICD-10-CM

## 2019-09-26 DIAGNOSIS — E66.01 MORBID OBESITY WITH BODY MASS INDEX (BMI) OF 60.0 TO 69.9 IN ADULT (HCC): ICD-10-CM

## 2019-09-26 RX ORDER — HYDROCODONE BITARTRATE AND ACETAMINOPHEN 7.5; 325 MG/1; MG/1
TABLET ORAL
Refills: 0 | COMMUNITY
Start: 2019-09-18 | End: 2019-11-07

## 2019-09-26 NOTE — LETTER
12/23/19 Patient: Joaquin Matute YOB: 1990 Date of Visit: 9/26/2019 Abimael Sanz MD 
[address] [facility] [address] VIA Facsimile: [phone] Dear Abimael Sanz MD, Thank you for referring Ms. Cheryl Sultana to [address] [place] [address] for evaluation. My notes for this consultation are attached. If you have questions, please do not hesitate to call me. I look forward to following your patient along with you.  
 
 
Sincerely, 
 
Subha Couch MD

## 2019-09-26 NOTE — PROGRESS NOTES
Chief Complaint   Patient presents with    Follow-up    Irregular Heart Beat     Persiistent A-fib     1. Have you been to the ER, urgent care clinic since your last visit? Hospitalized since your last visit? No    2. Have you seen or consulted any other health care providers outside of the [address] [place] [address] since your last visit? Include any pap smears or colon screening.  No

## 2019-09-26 NOTE — PATIENT INSTRUCTIONS
I have ordered an echocardiogram to determine if your heart's anatomy relative to episodes of atrial fibrillation. I have ordered a 48 hour heart monitor to see if you are having any rhythm disturbances that you don't feel. Please follow on with Dr. Daryl Mercado about a sleep medicine evaluation. I will communicate with you after I see the lab work from her office and I will want to see you as soon as the above tests are done.

## 2019-09-26 NOTE — PROGRESS NOTES
María Elena Levine is a 24-year-old female with morbid obesity, atrial fibrillation, and anemia. She presents today for initial cardiac office evaluation. Frequent headache. Thinks she snores badly. Does not directly address questions. History taking is difficult and convoluted. She has switched primary to Dr. Emery Mcnamara. She has a history of A fib, documented once in 2011 but with numerous episodes. Very frequent episodes when pregnant. She has had 4 pregancies. All 4 births were C sections, one with twins. She had ineffective labor and late term hypotension. The one documented Afib episode was in an ED during pregnancy. . She was given a single pill and the AF did not recur for one year. She is unable to recall more than that, and she is not sure which hospital might of been involved. With AF she gets sweaty and has near black out. She does not recall any episodes of complete syncope. Frequent AM headache. She states her thyroid is \"elevated\". First episode in her 19's. She needs clearance for endoscopy and for some limb orthopedic surgery. On exam, she is 5 feet 3 inches tall, she weighs 286 pounds. Respiratory rate is 16 without distress, with room air SPO2 of 97%. Blood pressure is 117/71 in the left arm, resting pulse is 84 and regular at the time of this examination. Palate is class 3, small tongue. She is significantly obese. Thyroid is not palpable. Jugular veins are difficult to visualize but appear flat. There are no carotid bruits. There is no stridor awake in a seated position. Lungs are clear. There is no wheezing. Cardiac auscultation shows regular rhythm with mildly diminished heart sounds probably due to obesity, no audible murmur or gallop. Peripheral pulses are intact. There is 2+ ankle edema with no sign of DVT. Abdomen is obese without tenderness, bruit, or discernible pulsation.     Twelve-lead electrocardiogram shows sinus rhythm, leftward axis, T wave inversion restricted to lead III, normal tracing otherwise. There is minimal prolongation of left atrial conduction. There is no available lab work after 2017 at which point hemoglobin was 10, creatinine was 0.42 with normal electrolytes. At that time, TSH was 0.36. There is no chest x-ray since that time in our system. Impression: History of possible atrial fibrillation, not documented as many times as the patient believes it has occurred    High probability of obstructive sleep apnea    Prolongation of left atrial conduction could suggest LA strain which could be related to left ventricular hypertrophy or diastolic dysfunction not manifest on the EKG.   This can be a product of sleep apnea if present    Plan:  Patient urged to pursue sleep study under the supervision of Dr. Emery Mcnamara    48-hour Holter monitor    Echocardiogram    Follow-up when results available    Marifer Szymanski MD, [facility]

## 2019-11-07 ENCOUNTER — HOSPITAL ENCOUNTER (EMERGENCY)
Age: 29
Discharge: HOME | End: 2019-11-07
Attending: EMERGENCY MEDICINE
Payer: COMMERCIAL

## 2019-11-07 ENCOUNTER — APPOINTMENT (OUTPATIENT)
Dept: GENERAL RADIOLOGY | Age: 29
End: 2019-11-07
Attending: NURSE PRACTITIONER
Payer: COMMERCIAL

## 2019-11-07 VITALS
SYSTOLIC BLOOD PRESSURE: 136 MMHG | HEIGHT: 65 IN | WEIGHT: 284 LBS | HEART RATE: 101 BPM | BODY MASS INDEX: 47.32 KG/M2 | OXYGEN SATURATION: 99 % | RESPIRATION RATE: 18 BRPM | TEMPERATURE: 97.5 F | DIASTOLIC BLOOD PRESSURE: 93 MMHG

## 2019-11-07 DIAGNOSIS — M79.671 RIGHT FOOT PAIN: ICD-10-CM

## 2019-11-07 DIAGNOSIS — M25.512 PAIN IN JOINT OF LEFT SHOULDER: ICD-10-CM
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
## 2019-11-07 PROCEDURE — 99283 EMERGENCY DEPT VISIT LOW MDM: CPT

## 2019-11-07 PROCEDURE — 73630 X-RAY EXAM OF FOOT: CPT

## 2019-11-07 PROCEDURE — 73030 X-RAY EXAM OF SHOULDER: CPT

## 2019-11-07 RX ORDER — IBUPROFEN 800 MG/1
800 TABLET ORAL
Qty: 20 TAB | Refills: 0 | Status: SHIPPED | OUTPATIENT
Start: 2019-11-07 | End: 2019-11-14

## 2019-11-07 NOTE — LETTER
[facility] EMERGENCY DEPT 
[address] [address] 
[phone] Work/School Note Date: 11/7/2019 To Whom It May concern: 
 
Brenda Hammer was seen and treated today in the emergency room by the following provider(s): 
Attending Provider: Bairon Fernandez MD 
Nurse Practitioner: Kelle Naik NP. Brenda Hammer may return to work on 11/9/19. Sincerely, Nighat Pinon NP
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
## 2019-11-07 NOTE — ED TRIAGE NOTES
Pt reports dull, diffuse chest pain, right foot pian and pain to \"the whole left side. \" Pt reports taking hydrocodone without relief
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
## 2019-11-07 NOTE — DISCHARGE INSTRUCTIONS

## 2019-12-22 ENCOUNTER — HOSPITAL ENCOUNTER (EMERGENCY)
Age: 29
Discharge: HOME | End: 2019-12-22
Attending: EMERGENCY MEDICINE | Admitting: EMERGENCY MEDICINE
Payer: COMMERCIAL

## 2019-12-22 VITALS
SYSTOLIC BLOOD PRESSURE: 123 MMHG | WEIGHT: 293 LBS | RESPIRATION RATE: 16 BRPM | DIASTOLIC BLOOD PRESSURE: 80 MMHG | HEART RATE: 84 BPM | TEMPERATURE: 98.1 F | BODY MASS INDEX: 50.02 KG/M2 | OXYGEN SATURATION: 99 % | HEIGHT: 64 IN

## 2019-12-22 DIAGNOSIS — D64.9 ANEMIA, UNSPECIFIED TYPE: Primary | ICD-10-CM

## 2019-12-22 DIAGNOSIS — R82.90 FOUL SMELLING URINE: ICD-10-CM

## 2019-12-22 LAB
ALBUMIN SERPL-MCNC: 3.2 G/DL (ref 3.5–5)
ALBUMIN/GLOB SERPL: 0.8 {RATIO} (ref 1.1–2.2)
ALP SERPL-CCNC: 74 U/L (ref 45–117)
ALT SERPL-CCNC: 18 U/L (ref 12–78)
ANION GAP SERPL CALC-SCNC: 4 MMOL/L (ref 5–15)
APPEARANCE UR: ABNORMAL
AST SERPL-CCNC: 7 U/L (ref 15–37)
BACTERIA URNS QL MICRO: ABNORMAL /HPF
BASOPHILS # BLD: 0 K/UL (ref 0–0.1)
BASOPHILS NFR BLD: 0 % (ref 0–1)
BILIRUB SERPL-MCNC: 0.1 MG/DL (ref 0.2–1)
BILIRUB UR QL: NEGATIVE
BUN SERPL-MCNC: 9 MG/DL (ref 6–20)
BUN/CREAT SERPL: 16 (ref 12–20)
CALCIUM SERPL-MCNC: 8.9 MG/DL (ref 8.5–10.1)
CHLORIDE SERPL-SCNC: 112 MMOL/L (ref 97–108)
CO2 SERPL-SCNC: 24 MMOL/L (ref 21–32)
COLOR UR: ABNORMAL
COMMENT, HOLDF: NORMAL
CREAT SERPL-MCNC: 0.55 MG/DL (ref 0.55–1.02)
DIFFERENTIAL METHOD BLD: ABNORMAL
EOSINOPHIL # BLD: 0.1 K/UL (ref 0–0.4)
EOSINOPHIL NFR BLD: 1 % (ref 0–7)
EPITH CASTS URNS QL MICRO: ABNORMAL /LPF
ERYTHROCYTE [DISTWIDTH] IN BLOOD BY AUTOMATED COUNT: 19.4 % (ref 11.5–14.5)
EST. AVERAGE GLUCOSE BLD GHB EST-MCNC: 120 MG/DL
GLOBULIN SER CALC-MCNC: 4 G/DL (ref 2–4)
GLUCOSE SERPL-MCNC: 109 MG/DL (ref 65–100)
GLUCOSE UR STRIP.AUTO-MCNC: NEGATIVE MG/DL
HBA1C MFR BLD: 5.8 % (ref 4–5.6)
HCG UR QL: NEGATIVE
HCT VFR BLD AUTO: 29.3 % (ref 35–47)
HGB BLD-MCNC: 8.1 G/DL (ref 11.5–16)
HGB UR QL STRIP: NEGATIVE
IMM GRANULOCYTES # BLD AUTO: 0 K/UL (ref 0–0.04)
IMM GRANULOCYTES NFR BLD AUTO: 0 % (ref 0–0.5)
KETONES UR QL STRIP.AUTO: NEGATIVE MG/DL
LEUKOCYTE ESTERASE UR QL STRIP.AUTO: NEGATIVE
LYMPHOCYTES # BLD: 2.4 K/UL (ref 0.8–3.5)
LYMPHOCYTES NFR BLD: 46 % (ref 12–49)
MCH RBC QN AUTO: 20.5 PG (ref 26–34)
MCHC RBC AUTO-ENTMCNC: 27.6 G/DL (ref 30–36.5)
MCV RBC AUTO: 74 FL (ref 80–99)
MONOCYTES # BLD: 0.3 K/UL (ref 0–1)
MONOCYTES NFR BLD: 6 % (ref 5–13)
NEUTS SEG # BLD: 2.5 K/UL (ref 1.8–8)
NEUTS SEG NFR BLD: 47 % (ref 32–75)
NITRITE UR QL STRIP.AUTO: NEGATIVE
NRBC # BLD: 0 K/UL (ref 0–0.01)
NRBC BLD-RTO: 0 PER 100 WBC
PH UR STRIP: 6 [PH] (ref 5–8)
PLATELET # BLD AUTO: 346 K/UL (ref 150–400)
PMV BLD AUTO: 9.9 FL (ref 8.9–12.9)
POTASSIUM SERPL-SCNC: 4 MMOL/L (ref 3.5–5.1)
PROT SERPL-MCNC: 7.2 G/DL (ref 6.4–8.2)
PROT UR STRIP-MCNC: NEGATIVE MG/DL
RBC # BLD AUTO: 3.96 M/UL (ref 3.8–5.2)
RBC #/AREA URNS HPF: ABNORMAL /HPF (ref 0–5)
RBC MORPH BLD: ABNORMAL
SAMPLES BEING HELD,HOLD: NORMAL
SODIUM SERPL-SCNC: 140 MMOL/L (ref 136–145)
SP GR UR REFRACTOMETRY: 1.02 (ref 1–1.03)
UROBILINOGEN UR QL STRIP.AUTO: 0.2 EU/DL (ref 0.2–1)
WBC # BLD AUTO: 5.3 K/UL (ref 3.6–11)
WBC URNS QL MICRO: ABNORMAL /HPF (ref 0–4)

## 2019-12-22 PROCEDURE — 36415 COLL VENOUS BLD VENIPUNCTURE: CPT

## 2019-12-22 PROCEDURE — 80053 COMPREHEN METABOLIC PANEL: CPT

## 2019-12-22 PROCEDURE — 85025 COMPLETE CBC W/AUTO DIFF WBC: CPT

## 2019-12-22 PROCEDURE — 81025 URINE PREGNANCY TEST: CPT

## 2019-12-22 PROCEDURE — 99283 EMERGENCY DEPT VISIT LOW MDM: CPT

## 2019-12-22 PROCEDURE — 83036 HEMOGLOBIN GLYCOSYLATED A1C: CPT

## 2019-12-22 PROCEDURE — 81003 URINALYSIS AUTO W/O SCOPE: CPT

## 2019-12-22 NOTE — ED PROVIDER NOTES
HPI   68-year-old black female who presents the ED concerned that her urine has a sweet, unusual odor for over 1 month. She states that she suffers from hidradenitis of both axilla and has been on antibiotics for the last couple months intermittently. She has an appointment to see a gastroenterologist at the end of December for episodic rectal bleeding. She has not had any medications or food today prior to arrival.  She denies any dysuria, hematuria abdominal pain or back pain. She is mainly concerned about the smell of her urine. She refuses any antibiotics or treatment for her hidradenitis she is planning to see a surgeon. Past Medical History:   Diagnosis Date    Adverse effect of anesthesia     ITCHING    Anemia     Arrhythmia     \"IRR. HR\" NO CARDIO. \"EPISODES OF AFIB. \"    Asthma     Chronic pain     Discharge from both nipples 2016    Heart abnormality     Atrial fibrillation     Ill-defined condition     DEPRESSION    Morbid obesity (HCC)        Past Surgical History:   Procedure Laterality Date     DELIVERY ONLY      c/s x 3    HX  SECTION      X 4    HX HEENT      tonsilectomy    HX OTHER SURGICAL      tonsillectomy c-sections +3    HX OTHER SURGICAL  2018     Excise hidradenitis, left axilla.      HX TUBAL LIGATION           Family History:   Problem Relation Age of Onset    Hypertension Father        Social History     Socioeconomic History    Marital status: SINGLE     Spouse name: Not on file    Number of children: Not on file    Years of education: Not on file    Highest education level: Not on file   Occupational History    Not on file   Social Needs    Financial resource strain: Not on file    Food insecurity:     Worry: Not on file     Inability: Not on file    Transportation needs:     Medical: Not on file     Non-medical: Not on file   Tobacco Use    Smoking status: Current Every Day Smoker     Packs/day: 0.25    Smokeless tobacco: Current User   Substance and Sexual Activity    Alcohol use: No    Drug use: No    Sexual activity: Yes     Partners: Male   Lifestyle    Physical activity:     Days per week: Not on file     Minutes per session: Not on file    Stress: Not on file   Relationships    Social connections:     Talks on phone: Not on file     Gets together: Not on file     Attends [employer] service: Not on file     Active member of club or organization: Not on file     Attends meetings of clubs or organizations: Not on file     Relationship status: Not on file    Intimate partner violence:     Fear of current or ex partner: Not on file     Emotionally abused: Not on file     Physically abused: Not on file     Forced sexual activity: Not on file   Other Topics Concern    Not on file   Social History Narrative    Not on file         ALLERGIES: Patient has no known allergies. Review of Systems   Constitutional: Negative for activity change, appetite change, fever and unexpected weight change. HENT: Negative for congestion and trouble swallowing. Eyes: Negative for visual disturbance. Respiratory: Negative for cough and shortness of breath. Gastrointestinal: Negative for abdominal pain, diarrhea, nausea and vomiting. Genitourinary: Negative for difficulty urinating, flank pain and frequency. Concerned over the \"fruity smell of her urine\"   Musculoskeletal: Negative for arthralgias, myalgias and neck pain. Skin: Negative for rash. Neurological: Negative for dizziness and headaches. All other systems reviewed and are negative. Vitals:    12/22/19 0803   BP: 123/80   Pulse: 84   Resp: 16   Temp: 98.1 °F (36.7 °C)   SpO2: 99%   Weight: (!) 174.6 kg (385 lb)   Height: 5' 4\" (1.626 m)            Physical Exam  Vitals signs and nursing note reviewed. Constitutional:       General: She is not in acute distress. Appearance: She is obese. She is not toxic-appearing or diaphoretic.       Comments: Black female; smoker; works in a [employer]   HENT:      Right Ear: Tympanic membrane normal.      Nose: Nose normal.      Mouth/Throat:      Mouth: Mucous membranes are moist.      Pharynx: Oropharynx is clear. No posterior oropharyngeal erythema. Neck:      Musculoskeletal: Normal range of motion. Cardiovascular:      Rate and Rhythm: Normal rate and regular rhythm. Pulmonary:      Effort: Pulmonary effort is normal.      Breath sounds: Normal breath sounds. Musculoskeletal: Normal range of motion. Lymphadenopathy:      Cervical: No cervical adenopathy. Skin:     General: Skin is warm and dry. Findings: No rash. Comments: Mildly tender small firm area right axilla; without fluctuance; without extending erythema, discharge or bruising   Neurological:      General: No focal deficit present. Psychiatric:         Mood and Affect: Mood normal.          MDM       Procedures      Patient has been reexamined and denies any complaints of pain or discomfort ; she does not want to wait for her hemoglobin A1c results ;she is anxious to leave to go to work. Discussed the need to take a women's multivitamin with iron daily her prenatal vitamin to help with her iron deficiency anemia. 9:57 AM  Patient's results and plan of care have been reviewed with her. Patient has verbally conveyed her understanding and agreement of her signs, symptoms, diagnosis, treatment and prognosis and additionally agrees to follow up as recommended or return to the Emergency Room should her condition change prior to follow-up. Discharge instructions have also been provided to the patient with some educational information regarding her diagnosis as well a list of reasons why she would want to return to the ER prior to her follow-up appointment should her condition change. Ricardo Durán NP

## 2019-12-22 NOTE — ED NOTES
Dilcia Hitchcock gave and reviewed discharge instructions with patient. Patient verbalizes understanding of discharge instructions. Pt alert and oriented, appears in no acute distress, respirations equal and unlabored. Ambulatory upon discharge with steady gait.

## 2019-12-22 NOTE — ED TRIAGE NOTES
Pt reports having abscess under both arms. Pt also reports having \"sweet smelling urine. \"  Pt denies having dysuria or frequency.

## 2019-12-22 NOTE — DISCHARGE INSTRUCTIONS
Patient Education      RECOMMEND TAKING PRENATAL VITAMINS OR WOMEN'S MULTIVITAMIN WITH IRON DAILY  Anemia: Care Instructions  Your Care Instructions    Anemia is a low level of red blood cells, which carry oxygen throughout your body. Many things can cause anemia. Lack of iron is one of the most common causes. Your body needs iron to make hemoglobin, a substance in red blood cells that carries oxygen from the lungs to your body's cells. Without enough iron, the body produces fewer and smaller red blood cells. As a result, your body's cells do not get enough oxygen, and you feel tired and weak. And you may have trouble concentrating. Bleeding is the most common cause of a lack of iron. You may have heavy menstrual bleeding or bleeding caused by conditions such as ulcers, hemorrhoids, or cancer. Regular use of aspirin or other anti-inflammatory medicines (such as ibuprofen) also can cause bleeding in some people. A lack of iron in your diet also can cause anemia, especially at times when the body needs more iron, such as during pregnancy, infancy, and the teen years. Your doctor may have prescribed iron pills. It may take several months of treatment for your iron levels to return to normal. Your doctor also may suggest that you eat foods that are rich in iron, such as meat and beans. There are many other causes of anemia. It is not always due to a lack of iron. Finding the specific cause of your anemia will help your doctor find the right treatment for you. Follow-up care is a key part of your treatment and safety. Be sure to make and go to all appointments, and call your doctor if you are having problems. It's also a good idea to know your test results and keep a list of the medicines you take. How can you care for yourself at home? · Take your medicines exactly as prescribed. Call your doctor if you think you are having a problem with your medicine.   · If your doctor recommends iron pills, take them as directed:  ? Try to take the pills on an empty stomach about 1 hour before or 2 hours after meals. But you may need to take iron with food to avoid an upset stomach. ? Do not take antacids or drink milk or caffeine drinks (such as coffee, tea, or cola) at the same time or within 2 hours of the time that you take your iron. They can make it hard for your body to absorb the iron. ? Vitamin C (from food or supplements) helps your body absorb iron. Try taking iron pills with a glass of orange juice or some other food that is high in vitamin C, such as citrus fruits. ? Iron pills may cause stomach problems, such as heartburn, nausea, diarrhea, constipation, and cramps. Be sure to drink plenty of fluids, and include fruits, vegetables, and fiber in your diet each day. Iron pills often make your bowel movements dark or green. ? If you forget to take an iron pill, do not take a double dose of iron the next time you take a pill. ? Keep iron pills out of the reach of small children. An overdose of iron can be very dangerous. · Follow your doctor's advice about eating iron-rich foods. These include red meat, shellfish, poultry, eggs, beans, raisins, whole-grain bread, and leafy green vegetables. · Steam vegetables to help them keep their iron content. When should you call for help? Call 911 anytime you think you may need emergency care. For example, call if:    · You have symptoms of a heart attack. These may include:  ? Chest pain or pressure, or a strange feeling in the chest.  ? Sweating. ? Shortness of breath. ? Nausea or vomiting. ? Pain, pressure, or a strange feeling in the back, neck, jaw, or upper belly or in one or both shoulders or arms. ? Lightheadedness or sudden weakness. ? A fast or irregular heartbeat. After you call 911, the  may tell you to chew 1 adult-strength or 2 to 4 low-dose aspirin. Wait for an ambulance. Do not try to drive yourself.     · You passed out (lost consciousness).  Call your doctor now or seek immediate medical care if:    · You have new or increased shortness of breath.     · You are dizzy or lightheaded, or you feel like you may faint.     · Your fatigue and weakness continue or get worse.     · You have any abnormal bleeding, such as:  ? Nosebleeds. ? Vaginal bleeding that is different (heavier, more frequent, at a different time of the month) than what you are used to.  ? Bloody or black stools, or rectal bleeding. ? Bloody or pink urine.    Watch closely for changes in your health, and be sure to contact your doctor if:    · You do not get better as expected. Where can you learn more? Go to http://cl-diana.info/. Enter R301 in the search box to learn more about \"Anemia: Care Instructions. \"  Current as of: March 28, 2019  Content Version: 12.2  © 7216-2066 [employer]. Care instructions adapted under license by [employer] (which disclaims liability or warranty for this information). If you have questions about a medical condition or this instruction, always ask your healthcare professional. [address] any warranty or liability for your use of this information.

## 2019-12-23 ENCOUNTER — DOCUMENTATION ONLY (OUTPATIENT)
Dept: CARDIOLOGY CLINIC | Age: 29
End: 2019-12-23

## 2019-12-24 NOTE — PROGRESS NOTES
This patient was assessed in September 2019 with a story for possible recurrent atrial fibrillation as well as history and physical findings suggestive of obstructive sleep apnea. Her primary physician is Dr. Ailin Torres. At that time echo and cardiac Holter were ordered but neither test have been done so far.     [address]

## 2022-06-28 NOTE — BRIEF OP NOTE
Attempted to call patient with no answer. Left message on voicemail instructing patient to return call at earliest convenience with callback number provided.'   BRIEF OPERATIVE NOTE    Date of Procedure: 1/3/2018   Preoperative Diagnosis:  Hidradenitis Left Axilla. Postoperative Diagnosis:  Same. Procedure(s):   Excise Hidradenitis Left Axilla. Apply Acellular Xenograft. Surgeon(s) and Role:   Tyrese Merritt MD - Primary  Surgical Staff:  Circ-1: Beth Ibarra RN  Scrub Tech-1: [facility]  Event Time In   Incision Start 1016   Incision Close 1054     Anesthesia: General   Estimated Blood Loss: Minimal.  Specimens:   ID Type Source Tests Collected by Time Destination   1 : HYDRADENITITS LEFT AXILLA Fresh Axilla  Tyrese Merritt MD 1/3/2018 1052 Pathology      Findings: Hidradenitis left axilla. Excise area - 3cm x 10cm. Complications: None. Implants:   Implant Name Type Inv.  Item Serial No.  Lot No. LRB No. Used Action   GRAFT MICRO MATRX 1000MG EA --  - [id number]  GRAFT MICRO MATRX 1000MG EA --  [id number] [employer] [id number] Left 1 Implanted   GRAFT WND 6-LAYER 7X10CM -- CYTAL - [id number]   GRAFT WND 6-LAYER 7X10CM -- CYTAL [id number] [employer] [id number] Left 1 Implanted

## 2022-09-07 ENCOUNTER — TELEPHONE (OUTPATIENT)
Dept: OBGYN CLINIC | Age: 32
End: 2022-09-07

## 2022-10-07 ENCOUNTER — OFFICE VISIT (OUTPATIENT)
Dept: OBGYN CLINIC | Age: 32
End: 2022-10-07
Payer: COMMERCIAL

## 2022-10-07 VITALS
HEIGHT: 64 IN | SYSTOLIC BLOOD PRESSURE: 126 MMHG | DIASTOLIC BLOOD PRESSURE: 78 MMHG | WEIGHT: 292 LBS | BODY MASS INDEX: 49.85 KG/M2

## 2022-10-07 DIAGNOSIS — Z01.419 ENCOUNTER FOR GYNECOLOGICAL EXAMINATION WITHOUT ABNORMAL FINDING: Primary | ICD-10-CM

## 2022-10-07 PROCEDURE — 99202 OFFICE O/P NEW SF 15 MIN: CPT | Performed by: OBSTETRICS & GYNECOLOGY

## 2022-10-07 NOTE — PROGRESS NOTES
Annual exam    Juancarlos Grover is a 33 y.o. presenting for a problem visit, but then relayed that she would also like an annual exam. Her main concerns today include \"pain in her ovaries. \"     She state she has pain when she lays on her back and presses on her lower abdomen. Pain occurs on both sides, but is worse on L side. She would like to have a ultrasound done to check on this, she is worried due to findings of a uterine window noted at the time of her last  at the time of delivery. The pain has been present for a few years duration. She accepts a chaperone during the gynecologic exam today. Ob/Gyn Hx:   - EAB, then c/s x 4; kids age 15 (twins!), 15, 9, [address] Dr. Antony Gutierres delivered [place]. LMP -   Menses - regular and heavy (4 days) with painful cramping  Contraception - BTL  STI - accepts screening  SA -    Health maintenance:  Pap - unsure  Gardasil -      Past Medical History:   Diagnosis Date    Adverse effect of anesthesia     ITCHING    Anemia     Arrhythmia     \"IRR. HR\" NO CARDIO. \"EPISODES OF AFIB. \"    Asthma     Chronic pain     Discharge from both nipples 2016    Heart abnormality     Atrial fibrillation     Ill-defined condition     DEPRESSION    Morbid obesity ([facility] [address]        Past Surgical History:   Procedure Laterality Date    HX  SECTION      X 4    HX HEENT      tonsilectomy    HX OTHER SURGICAL      tonsillectomy c-sections +3    HX OTHER SURGICAL  2018     Excise hidradenitis, left axilla.      HX TUBAL LIGATION      [place]  DELIVERY ONLY      c/s x 3       Family History   Problem Relation Age of Onset    Hypertension Father        Social History     Socioeconomic History    Marital status: SINGLE     Spouse name: Not on file    Number of children: Not on file    Years of education: Not on file    Highest education level: Not on file   Occupational History    Not on file   Tobacco Use    Smoking status: Former     Packs/day: 0.25     Types: Cigarettes    Smokeless tobacco: Current   Substance and Sexual Activity    Alcohol use: No    Drug use: No    Sexual activity: Yes     Partners: Male     Birth control/protection: Surgical   Other Topics Concern    Not on file   Social History Narrative    Not on file     Social Determinants of Health     Financial Resource Strain: Not on file   Food Insecurity: Not on file   Transportation Needs: Not on file   Physical Activity: Not on file   Stress: Not on file   Social Connections: Not on file   Intimate Partner Violence: Not on file   Housing Stability: Not on file           No Known Allergies    Review of Systems - History obtained from the patient  Constitutional: negative for weight loss, fever, night sweats  HEENT: negative for hearing loss, earache, congestion, snoring, sorethroat  CV: negative for chest pain, palpitations, edema  Resp: negative for cough, shortness of breath, wheezing  GI: negative for change in bowel habits, abdominal pain, black or bloody stools  : negative for frequency, dysuria, hematuria, vaginal discharge  MSK: negative for back pain, joint pain, muscle pain  Breast: negative for breast lumps, nipple discharge, galactorrhea  Skin :negative for itching, rash, hives  Neuro: negative for dizziness, headache, confusion, weakness  Psych: negative for anxiety, depression, change in mood  Heme/lymph: negative for bleeding, bruising, pallor    Physical Exam    Visit Vitals  /78   Ht 5' 4\" (1.626 m)   Wt 292 lb (132.5 kg)   BMI 50.12 kg/m²       Constitutional  Appearance: well-nourished, well developed, alert, in no acute distress    HENT  Head and Face: appears normal    Neck  Inspection/Palpation: normal appearance, no masses or tenderness  Lymph Nodes: no lymphadenopathy present  Thyroid: gland size normal, nontender, no nodules or masses present on palpation    Chest  Respiratory Effort: non-labored breathing  Auscultation: CTAB, normal breath sounds    Cardiovascular  Heart: Auscultation: regular rate and rhythm without murmur  Extremities: no peripheral edema    Breasts  Inspection of Breasts: breasts symmetrical, no skin changes, no discharge present, nipple appearance normal, no skin retraction present  Palpation of Breasts and Axillae: no masses present on palpation, no breast tenderness  Axillary Lymph Nodes: no lymphadenopathy present    Gastrointestinal  Abdominal Examination: abdomen non-tender to palpation, normal bowel sounds, no masses present  Liver and spleen: no hepatomegaly present, spleen not palpable  Hernias: no hernias identified    Genitourinary  External Genitalia: normal appearance for age, no discharge present, no tenderness present, no inflammatory lesions present, no masses present, no atrophy present  Vagina: normal vaginal vault without central or paravaginal defects, no discharge present, no inflammatory lesions present, no masses present  Bladder: non-tender to palpation  Urethra: appears normal  Cervix: normal   Uterus: normal size, shape and consistency, mild pain with palpation  Adnexa: mild adnexal tenderness present, no adnexal masses present  Perineum: perineum within normal limits, no evidence of trauma, no rashes or skin lesions present    Skin  General Inspection: no rash, no lesions identified    Neurologic/Psychiatric  Mental Status:  Orientation: grossly oriented to person, place and time  Mood and Affect: mood normal, affect appropriate      Assessment/Plan:  33 y.o. presenting for annual exam. Overall doing well. Well woman exam:  Normal gynecologic and breast exams. Healthy habits and lifestyle reviewed. Pap with HPV performed today. Patient accepts pelvic STD screening. Contraception and menstrual regulation - patient is s/p BTL. Pelvic pain - we discussed possible causes. Check GYN US, and plan for possible Nexplanon placement at that visit for menstrual relief and ovulation suppression. Alexandra Powers MD

## 2022-10-08 LAB
BACTERIA SPEC CULT: NORMAL
CC UR VC: NORMAL
SERVICE CMNT-IMP: NORMAL

## 2022-10-13 LAB
C TRACH RRNA CVX QL NAA+PROBE: NEGATIVE
CYTOLOGIST CVX/VAG CYTO: ABNORMAL
CYTOLOGY CVX/VAG DOC CYTO: ABNORMAL
CYTOLOGY CVX/VAG DOC THIN PREP: ABNORMAL
DX ICD CODE: ABNORMAL
HPV GENOTYPE 18,45: NEGATIVE
HPV I/H RISK 4 DNA CVX QL PROBE+SIG AMP: POSITIVE
HPV16 DNA CVX QL PROBE+SIG AMP: NEGATIVE
Lab: ABNORMAL
N GONORRHOEA RRNA CVX QL NAA+PROBE: NEGATIVE
OTHER STN SPEC: ABNORMAL
STAT OF ADQ CVX/VAG CYTO-IMP: ABNORMAL
T VAGINALIS RRNA SPEC QL NAA+PROBE: NEGATIVE

## 2023-04-21 ENCOUNTER — TRANSCRIBE ORDER (OUTPATIENT)
Dept: SCHEDULING | Age: 33
End: 2023-04-21

## 2023-04-21 DIAGNOSIS — R79.89 LOW TSH LEVEL: Primary | ICD-10-CM

## 2023-05-08 DIAGNOSIS — R79.89 LOW TSH LEVEL: Primary | ICD-10-CM

## 2023-05-15 LAB — HBA1C MFR BLD HPLC: 5.6 %

## 2023-06-09 ENCOUNTER — HOSPITAL ENCOUNTER (OUTPATIENT)
Facility: HOSPITAL | Age: 33
Discharge: HOME | End: 2023-06-09
Payer: COMMERCIAL

## 2023-06-09 DIAGNOSIS — R79.89 LOW TSH LEVEL: ICD-10-CM

## 2023-06-09 PROCEDURE — 76536 US EXAM OF HEAD AND NECK: CPT

## 2023-09-14 NOTE — PROGRESS NOTES
Problem Visit    Kathy Hobbs is a 27 y.o.  presenting for problem visit. Her main concern today is heavy periods. Also has c/o low abdominal pain, states this is constant but worse with her periods. States her Hgb is always low due to her heavy periods. Reports Dr. Sana Grace (at [facility]) prescribed ibuprofen for menstrual cramping, she was recently taking off of ibuprofen due to GI reasons. Also had Hgb checked two months ago and was low. Not able to tolerate iron iron due to constipation. She is again complaining of pain in her uterus, worse with certain movements. She had a uterine window found at the time of her last . She has been worried about this finding. Ob/Gyn Hx:   - EAB, then c/s x 4; kids age 13 (twins!), 15, 6, 7yo -[place]. Dr. Farhan Gonzales delivered [place]  LMP- 21  Menses- regular, heavy  Contraception- BTL  SA- yes      Past Medical History:   Diagnosis Date    Adverse effect of anesthesia     ITCHING    Anemia     Arrhythmia     \"IRR. HR\" NO CARDIO. \"EPISODES OF AFIB. \"    Asthma     Chronic pain     Discharge from both nipples 2016    Heart abnormality     Atrial fibrillation     Ill-defined condition     DEPRESSION    Morbid obesity ([address])        Past Surgical History:   Procedure Laterality Date     DELIVERY ONLY      c/s x 3     SECTION      X 4    HEENT      tonsilectomy    OTHER SURGICAL HISTORY  2018     Excise hidradenitis, left axilla.      OTHER SURGICAL HISTORY      tonsillectomy c-sections +3    TUBAL LIGATION         Family History   Problem Relation Age of Onset    Hypertension Father        Social History     Socioeconomic History    Marital status: Single     Spouse name: Not on file    Number of children: Not on file    Years of education: Not on file    Highest education level: Not on file   Occupational History    Not on file   Tobacco Use    Smoking status: Former     Packs/day: .25     Types: Cigarettes    Smokeless

## 2023-09-18 ENCOUNTER — OFFICE VISIT (OUTPATIENT)
Age: 33
End: 2023-09-18
Payer: COMMERCIAL

## 2023-09-18 VITALS
WEIGHT: 270 LBS | DIASTOLIC BLOOD PRESSURE: 80 MMHG | SYSTOLIC BLOOD PRESSURE: 124 MMHG | BODY MASS INDEX: 46.1 KG/M2 | HEIGHT: 64 IN

## 2023-09-18 DIAGNOSIS — D50.0 IRON DEFICIENCY ANEMIA DUE TO CHRONIC BLOOD LOSS: ICD-10-CM

## 2023-09-18 DIAGNOSIS — N92.0 MENORRHAGIA WITH REGULAR CYCLE: Primary | ICD-10-CM

## 2023-09-18 LAB
ERYTHROCYTE [DISTWIDTH] IN BLOOD BY AUTOMATED COUNT: 19 % (ref 11.5–14.5)
FERRITIN SERPL-MCNC: 2 NG/ML (ref 8–252)
HCT VFR BLD AUTO: 26.5 % (ref 35–47)
HGB BLD-MCNC: 6.9 G/DL (ref 11.5–16)
MCH RBC QN AUTO: 18.4 PG (ref 26–34)
MCHC RBC AUTO-ENTMCNC: 26 G/DL (ref 30–36.5)
MCV RBC AUTO: 70.7 FL (ref 80–99)
NRBC # BLD: 0 K/UL (ref 0–0.01)
NRBC BLD-RTO: 0 PER 100 WBC
PLATELET # BLD AUTO: 367 K/UL (ref 150–400)
PMV BLD AUTO: 10 FL (ref 8.9–12.9)
RBC # BLD AUTO: 3.75 M/UL (ref 3.8–5.2)
WBC # BLD AUTO: 3.8 K/UL (ref 3.6–11)

## 2023-09-18 PROCEDURE — 99214 OFFICE O/P EST MOD 30 MIN: CPT | Performed by: OBSTETRICS & GYNECOLOGY

## 2023-09-18 RX ORDER — ONDANSETRON 4 MG/1
4 TABLET, ORALLY DISINTEGRATING ORAL 3 TIMES DAILY PRN
Qty: 21 TABLET | Refills: 0 | Status: SHIPPED | OUTPATIENT
Start: 2023-09-18

## 2023-09-18 RX ORDER — DROSPIRENONE 4 MG/1
1 TABLET, FILM COATED ORAL DAILY
Qty: 84 TABLET | Refills: 4 | Status: SHIPPED | OUTPATIENT
Start: 2023-09-18

## 2023-09-19 DIAGNOSIS — D50.0 IRON DEFICIENCY ANEMIA DUE TO CHRONIC BLOOD LOSS: Primary | ICD-10-CM

## 2023-12-01 ENCOUNTER — OFFICE VISIT (OUTPATIENT)
Age: 33
End: 2023-12-01

## 2023-12-01 VITALS
SYSTOLIC BLOOD PRESSURE: 136 MMHG | DIASTOLIC BLOOD PRESSURE: 80 MMHG | HEIGHT: 64 IN | BODY MASS INDEX: 46.78 KG/M2 | WEIGHT: 274 LBS

## 2023-12-01 DIAGNOSIS — N92.0 MENORRHAGIA WITH REGULAR CYCLE: Primary | ICD-10-CM

## 2023-12-01 DIAGNOSIS — R10.2 PELVIC PAIN: ICD-10-CM

## 2024-04-15 ENCOUNTER — TELEPHONE (OUTPATIENT)
Age: 34
End: 2024-04-15

## 2024-04-15 NOTE — TELEPHONE ENCOUNTER
----- Message from Lacey Deng MD sent at 4/13/2024  5:53 PM EDT -----  Regarding: missed appt  Pt missed her last appt with me (she has a history of no shows) which was supposed to be for an endometrial biopsy and Nexplanon placement.   Please call (and document the call) pt and attempt to get her rescheduled for these two procedures, can be done on same day as long as adequate time of 40 minutes. Please only schedule for end/last pt of morning or afternoon. She should arrive 15 minutes early for enough time for a UPT and intake questions.

## 2024-04-15 NOTE — TELEPHONE ENCOUNTER
Spoke with pt in re to her missed apt for EMBX and nexplanon insertion. Offered to go ahead and set apt for pt, she states that she will call us back as she needs to look at her schedule. I verbalized understanding.

## (undated) DEVICE — TOWEL SURG W17XL27IN STD BLU COT NONFENESTRATED PREWASHED

## (undated) DEVICE — SOLUTION IV 1000ML 0.9% SOD CHL

## (undated) DEVICE — DRAPE,REIN 53X77,STERILE: Brand: MEDLINE

## (undated) DEVICE — ASTOUND STANDARD SURGICAL GOWN, XL: Brand: CONVERTORS

## (undated) DEVICE — DBD-PACK,LAPAROTOMY,2 REINFORCED GOWNS: Brand: MEDLINE

## (undated) DEVICE — Z INACTIVE USE 2275367 DRESSING GEL 3OZ HYDRGEL WND FILL CARRASYN

## (undated) DEVICE — GAUZE SPONGES,12 PLY: Brand: CURITY

## (undated) DEVICE — REM POLYHESIVE ADULT PATIENT RETURN ELECTRODE: Brand: VALLEYLAB

## (undated) DEVICE — STERILE POLYISOPRENE POWDER-FREE SURGICAL GLOVES: Brand: PROTEXIS

## (undated) DEVICE — INFECTION CONTROL KIT SYS

## (undated) DEVICE — SUT ETHLN 3-0 18IN PS2 BLK --

## (undated) DEVICE — SUTURE VCRL SZ 3-0 L27IN ABSRB UD L26MM SH 1/2 CIR J416H

## (undated) DEVICE — ROCKER SWITCH PENCIL BLADE ELECTRODE, HOLSTER: Brand: EDGE

## (undated) DEVICE — CURITY NON-ADHERENT STRIPS: Brand: CURITY

## (undated) DEVICE — BANDAGE COMPR SELF ADH 5 YDX4 IN TAN STRL PREMIERPRO LF

## (undated) DEVICE — (D)PREP SKN CHLRAPRP APPL 26ML -- CONVERT TO ITEM 371833

## (undated) DEVICE — SUTURE VCRL SZ 2-0 L27IN ABSRB UD L26MM SH 1/2 CIR J417H

## (undated) DEVICE — SUPER SPONGES,MEDIUM: Brand: DERMACEA

## (undated) DEVICE — SURGICAL PROCEDURE PACK BASIN MAJ SET CUST NO CAUT

## (undated) DEVICE — CONVERTORS STOCKINETTE: Brand: CONVERTORS

## (undated) DEVICE — NON-ADHERENT STRIPS,OIL EMULSION: Brand: CURITY